# Patient Record
Sex: FEMALE | Race: WHITE | NOT HISPANIC OR LATINO | Employment: OTHER | URBAN - NONMETROPOLITAN AREA
[De-identification: names, ages, dates, MRNs, and addresses within clinical notes are randomized per-mention and may not be internally consistent; named-entity substitution may affect disease eponyms.]

---

## 2020-08-24 ENCOUNTER — APPOINTMENT (EMERGENCY)
Dept: RADIOLOGY | Facility: HOSPITAL | Age: 85
End: 2020-08-24
Payer: MEDICARE

## 2020-08-24 ENCOUNTER — APPOINTMENT (INPATIENT)
Dept: CT IMAGING | Facility: HOSPITAL | Age: 85
End: 2020-08-24
Payer: MEDICARE

## 2020-08-24 ENCOUNTER — HOSPITAL ENCOUNTER (EMERGENCY)
Facility: HOSPITAL | Age: 85
Discharge: HOME | End: 2020-08-24
Attending: EMERGENCY MEDICINE | Admitting: EMERGENCY MEDICINE
Payer: MEDICARE

## 2020-08-24 ENCOUNTER — HOSPITAL ENCOUNTER (OUTPATIENT)
Facility: HOSPITAL | Age: 85
Setting detail: OBSERVATION
Discharge: HOME | End: 2020-08-25
Attending: EMERGENCY MEDICINE | Admitting: FAMILY MEDICINE
Payer: MEDICARE

## 2020-08-24 VITALS
RESPIRATION RATE: 18 BRPM | SYSTOLIC BLOOD PRESSURE: 139 MMHG | OXYGEN SATURATION: 97 % | TEMPERATURE: 97.4 F | HEART RATE: 71 BPM | DIASTOLIC BLOOD PRESSURE: 64 MMHG | WEIGHT: 130.95 LBS

## 2020-08-24 DIAGNOSIS — S32.89XA CLOSED FRACTURE OF OTHER PARTS OF PELVIS, INITIAL ENCOUNTER (HCC): ICD-10-CM

## 2020-08-24 DIAGNOSIS — R55 SYNCOPE: Primary | ICD-10-CM

## 2020-08-24 DIAGNOSIS — T63.441A BEE STING: Primary | ICD-10-CM

## 2020-08-24 DIAGNOSIS — S60.449A CONSTRICTIVE JEWELRY OF FINGER, INITIAL ENCOUNTER: ICD-10-CM

## 2020-08-24 DIAGNOSIS — M25.551 ACUTE HIP PAIN, RIGHT: ICD-10-CM
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
## 2020-08-24 LAB
ALBUMIN SERPL BCP-MCNC: 3.6 G/DL (ref 3.5–5)
ALP SERPL-CCNC: 59 U/L (ref 46–116)
ALT SERPL W P-5'-P-CCNC: 30 U/L (ref 12–78)
ANION GAP SERPL CALCULATED.3IONS-SCNC: 7 MMOL/L (ref 4–13)
AST SERPL W P-5'-P-CCNC: 24 U/L (ref 5–45)
BASOPHILS # BLD AUTO: 0.02 THOUSANDS/ΜL (ref 0–0.1)
BASOPHILS NFR BLD AUTO: 0 % (ref 0–1)
BILIRUB SERPL-MCNC: 0.51 MG/DL (ref 0.2–1)
BUN SERPL-MCNC: 21 MG/DL (ref 5–25)
CALCIUM SERPL-MCNC: 9 MG/DL (ref 8.3–10.1)
CHLORIDE SERPL-SCNC: 106 MMOL/L (ref 100–108)
CO2 SERPL-SCNC: 29 MMOL/L (ref 21–32)
CREAT SERPL-MCNC: 0.83 MG/DL (ref 0.6–1.3)
EOSINOPHIL # BLD AUTO: 0.17 THOUSAND/ΜL (ref 0–0.61)
EOSINOPHIL NFR BLD AUTO: 2 % (ref 0–6)
ERYTHROCYTE [DISTWIDTH] IN BLOOD BY AUTOMATED COUNT: 12.8 % (ref 11.6–15.1)
GFR SERPL CREATININE-BSD FRML MDRD: 65 ML/MIN/1.73SQ M
GLUCOSE SERPL-MCNC: 122 MG/DL (ref 65–140)
HCT VFR BLD AUTO: 45.7 % (ref 34.8–46.1)
HGB BLD-MCNC: 15 G/DL (ref 11.5–15.4)
IMM GRANULOCYTES # BLD AUTO: 0.04 THOUSAND/UL (ref 0–0.2)
IMM GRANULOCYTES NFR BLD AUTO: 0 % (ref 0–2)
LYMPHOCYTES # BLD AUTO: 2.38 THOUSANDS/ΜL (ref 0.6–4.47)
LYMPHOCYTES NFR BLD AUTO: 25 % (ref 14–44)
MCH RBC QN AUTO: 30.7 PG (ref 26.8–34.3)
MCHC RBC AUTO-ENTMCNC: 32.8 G/DL (ref 31.4–37.4)
MCV RBC AUTO: 94 FL (ref 82–98)
MONOCYTES # BLD AUTO: 0.52 THOUSAND/ΜL (ref 0.17–1.22)
MONOCYTES NFR BLD AUTO: 6 % (ref 4–12)
NEUTROPHILS # BLD AUTO: 6.3 THOUSANDS/ΜL (ref 1.85–7.62)
NEUTS SEG NFR BLD AUTO: 67 % (ref 43–75)
NRBC BLD AUTO-RTO: 0 /100 WBCS
PLATELET # BLD AUTO: 236 THOUSANDS/UL (ref 149–390)
PMV BLD AUTO: 10.4 FL (ref 8.9–12.7)
POTASSIUM SERPL-SCNC: 3.9 MMOL/L (ref 3.5–5.3)
PROT SERPL-MCNC: 6.6 G/DL (ref 6.4–8.2)
RBC # BLD AUTO: 4.88 MILLION/UL (ref 3.81–5.12)
SODIUM SERPL-SCNC: 142 MMOL/L (ref 136–145)
TROPONIN I SERPL-MCNC: <0.02 NG/ML
WBC # BLD AUTO: 9.43 THOUSAND/UL (ref 4.31–10.16)

## 2020-08-24 PROCEDURE — G1004 CDSM NDSC: HCPCS

## 2020-08-24 PROCEDURE — 99285 EMERGENCY DEPT VISIT HI MDM: CPT | Performed by: EMERGENCY MEDICINE

## 2020-08-24 PROCEDURE — 73502 X-RAY EXAM HIP UNI 2-3 VIEWS: CPT

## 2020-08-24 PROCEDURE — 99222 1ST HOSP IP/OBS MODERATE 55: CPT | Performed by: FAMILY MEDICINE

## 2020-08-24 PROCEDURE — 96360 HYDRATION IV INFUSION INIT: CPT

## 2020-08-24 PROCEDURE — 96375 TX/PRO/DX INJ NEW DRUG ADDON: CPT

## 2020-08-24 PROCEDURE — 84484 ASSAY OF TROPONIN QUANT: CPT | Performed by: EMERGENCY MEDICINE

## 2020-08-24 PROCEDURE — 93005 ELECTROCARDIOGRAM TRACING: CPT

## 2020-08-24 PROCEDURE — 85025 COMPLETE CBC W/AUTO DIFF WBC: CPT | Performed by: EMERGENCY MEDICINE

## 2020-08-24 PROCEDURE — 96374 THER/PROPH/DIAG INJ IV PUSH: CPT

## 2020-08-24 PROCEDURE — 72192 CT PELVIS W/O DYE: CPT

## 2020-08-24 PROCEDURE — 99285 EMERGENCY DEPT VISIT HI MDM: CPT

## 2020-08-24 PROCEDURE — 80053 COMPREHEN METABOLIC PANEL: CPT | Performed by: EMERGENCY MEDICINE

## 2020-08-24 PROCEDURE — 99283 EMERGENCY DEPT VISIT LOW MDM: CPT

## 2020-08-24 PROCEDURE — 36415 COLL VENOUS BLD VENIPUNCTURE: CPT | Performed by: EMERGENCY MEDICINE

## 2020-08-24 RX ORDER — SIMVASTATIN 40 MG
40 TABLET ORAL
COMMUNITY

## 2020-08-24 RX ORDER — ONDANSETRON 2 MG/ML
4 INJECTION INTRAMUSCULAR; INTRAVENOUS EVERY 6 HOURS PRN
Status: DISCONTINUED | OUTPATIENT
Start: 2020-08-24 | End: 2020-08-25 | Stop reason: HOSPADM

## 2020-08-24 RX ORDER — LEVOTHYROXINE SODIUM 0.07 MG/1
75 TABLET ORAL DAILY
COMMUNITY

## 2020-08-24 RX ORDER — NAPROXEN 250 MG/1
250 TABLET ORAL 2 TIMES DAILY WITH MEALS
COMMUNITY
End: 2020-08-25 | Stop reason: HOSPADM

## 2020-08-24 RX ORDER — PREDNISONE 20 MG/1
40 TABLET ORAL DAILY
Qty: 10 TABLET | Refills: 0 | Status: SHIPPED | OUTPATIENT
Start: 2020-08-25 | End: 2020-08-25 | Stop reason: HOSPADM

## 2020-08-24 RX ORDER — FOLIC ACID/MULTIVIT,IRON,MINER .4-18-35
1 TABLET,CHEWABLE ORAL DAILY
COMMUNITY

## 2020-08-24 RX ORDER — LATANOPROST 50 UG/ML
1 SOLUTION/ DROPS OPHTHALMIC
COMMUNITY

## 2020-08-24 RX ORDER — METHYLPREDNISOLONE SODIUM SUCCINATE 125 MG/2ML
125 INJECTION, POWDER, LYOPHILIZED, FOR SOLUTION INTRAMUSCULAR; INTRAVENOUS ONCE
Status: COMPLETED | OUTPATIENT
Start: 2020-08-24 | End: 2020-08-24

## 2020-08-24 RX ORDER — ASPIRIN 81 MG/1
81 TABLET, CHEWABLE ORAL DAILY
COMMUNITY
End: 2020-08-25 | Stop reason: HOSPADM

## 2020-08-24 RX ORDER — SODIUM CHLORIDE 9 MG/ML
100 INJECTION, SOLUTION INTRAVENOUS CONTINUOUS
Status: DISCONTINUED | OUTPATIENT
Start: 2020-08-24 | End: 2020-08-25

## 2020-08-24 RX ORDER — SODIUM CHLORIDE 9 MG/ML
175 INJECTION, SOLUTION INTRAVENOUS CONTINUOUS
Status: DISCONTINUED | OUTPATIENT
Start: 2020-08-24 | End: 2020-08-24

## 2020-08-24 RX ORDER — LEVOTHYROXINE SODIUM 0.1 MG/1
100 TABLET ORAL DAILY
COMMUNITY
End: 2020-08-25 | Stop reason: HOSPADM

## 2020-08-24 RX ORDER — CALCIUM CARBONATE 200(500)MG
1000 TABLET,CHEWABLE ORAL DAILY PRN
Status: DISCONTINUED | OUTPATIENT
Start: 2020-08-24 | End: 2020-08-25 | Stop reason: HOSPADM

## 2020-08-24 RX ORDER — DIPHENHYDRAMINE HYDROCHLORIDE 50 MG/ML
25 INJECTION INTRAMUSCULAR; INTRAVENOUS ONCE
Status: COMPLETED | OUTPATIENT
Start: 2020-08-24 | End: 2020-08-24

## 2020-08-24 RX ORDER — LANOLIN ALCOHOL/MO/W.PET/CERES
2 CREAM (GRAM) TOPICAL DAILY
COMMUNITY

## 2020-08-24 RX ORDER — ACETAMINOPHEN 325 MG/1
650 TABLET ORAL EVERY 6 HOURS PRN
Status: DISCONTINUED | OUTPATIENT
Start: 2020-08-24 | End: 2020-08-25 | Stop reason: HOSPADM

## 2020-08-24 RX ADMIN — METHYLPREDNISOLONE SODIUM SUCCINATE 125 MG: 125 INJECTION, POWDER, FOR SOLUTION INTRAMUSCULAR; INTRAVENOUS at 10:28

## 2020-08-24 RX ADMIN — SODIUM CHLORIDE 100 ML/HR: 0.9 INJECTION, SOLUTION INTRAVENOUS at 17:56

## 2020-08-24 RX ADMIN — DIPHENHYDRAMINE HYDROCHLORIDE 25 MG: 50 INJECTION, SOLUTION INTRAMUSCULAR; INTRAVENOUS at 10:28

## 2020-08-24 RX ADMIN — SODIUM CHLORIDE 175 ML/HR: 0.9 INJECTION, SOLUTION INTRAVENOUS at 14:18

## 2020-08-24 RX ADMIN — FAMOTIDINE 20 MG: 10 INJECTION, SOLUTION INTRAVENOUS at 10:28

## 2020-08-24 NOTE — PLAN OF CARE
Problem: Potential for Falls  Goal: Patient will remain free of falls  Description: INTERVENTIONS:  - Assess patient frequently for physical needs  -  Identify cognitive and physical deficits and behaviors that affect risk of falls    -  [place] fall precautions as indicated by assessment   - Educate patient/family on patient safety including physical limitations  - Instruct patient to call for assistance with activity based on assessment  - Modify environment to reduce risk of injury  - Consider OT/PT consult to assist with strengthening/mobility  Outcome: Progressing     Problem: CARDIOVASCULAR - ADULT  Goal: Maintains optimal cardiac output and hemodynamic stability  Description: INTERVENTIONS:  - Monitor I/O, vital signs and rhythm  - Monitor for S/S and trends of decreased cardiac output  - Administer and titrate ordered vasoactive medications to optimize hemodynamic stability  - Assess quality of pulses, skin color and temperature  - Assess for signs of decreased coronary artery perfusion  - Instruct patient to report change in severity of symptoms  Outcome: Progressing  Goal: Absence of cardiac dysrhythmias or at baseline rhythm  Description: INTERVENTIONS:  - Continuous cardiac monitoring, vital signs, obtain 12 lead EKG if ordered  - Administer antiarrhythmic and heart rate control medications as ordered  - Monitor electrolytes and administer replacement therapy as ordered  Outcome: Progressing     Problem: SKIN/TISSUE INTEGRITY - ADULT  Goal: Skin integrity remains intact  Description: INTERVENTIONS  - Identify patients at risk for skin breakdown  - Assess and monitor skin integrity  - Assess and monitor nutrition and hydration status  - Monitor labs (i e  albumin)  - Assess for incontinence   - Turn and reposition patient  - Assist with mobility/ambulation  - Relieve pressure over bony prominences  - Avoid friction and shearing  - Provide appropriate hygiene as needed including keeping skin clean and dry  - Evaluate need for skin moisturizer/barrier cream  - Collaborate with interdisciplinary team (i e  Nutrition, Rehabilitation, etc )   - Patient/family teaching  Outcome: Progressing     Problem: MUSCULOSKELETAL - ADULT  Goal: Maintain or return mobility to safest level of function  Description: INTERVENTIONS:  - Assess patient's ability to carry out ADLs; assess patient's baseline for ADL function and identify physical deficits which impact ability to perform ADLs (bathing, care of mouth/teeth, toileting, grooming, dressing, etc )  - Assess/evaluate cause of self-care deficits   - Assess range of motion  - Assess patient's mobility  - Assess patient's need for assistive devices and provide as appropriate  - Encourage maximum independence but intervene and supervise when necessary  - Involve family in performance of ADLs  - Assess for home care needs following discharge   - Consider OT consult to assist with ADL evaluation and planning for discharge  - Provide patient education as appropriate  Outcome: Progressing  Goal: Maintain proper alignment of affected body part  Description: INTERVENTIONS:  - Support, maintain and protect limb and body alignment  - Provide patient/ family with appropriate education  Outcome: Progressing     Problem: PAIN - ADULT  Goal: Verbalizes/displays adequate comfort level or baseline comfort level  Description: Interventions:  - Encourage patient to monitor pain and request assistance  - Assess pain using appropriate pain scale  - Administer analgesics based on type and severity of pain and evaluate response  - Implement non-pharmacological measures as appropriate and evaluate response  - Consider cultural and social influences on pain and pain management  - Notify physician/advanced practitioner if interventions unsuccessful or patient reports new pain  Outcome: Progressing     Problem: INFECTION - ADULT  Goal: Absence or prevention of progression during hospitalization  Description: INTERVENTIONS:  - Assess and monitor for signs and symptoms of infection  - Monitor lab/diagnostic results  - Monitor all insertion sites, i e  indwelling lines, tubes, and drains  - Monitor endotracheal if appropriate and nasal secretions for changes in amount and color  - [place] appropriate cooling/warming therapies per order  - Administer medications as ordered  - Instruct and encourage patient and family to use good hand hygiene technique  - Identify and instruct in appropriate isolation precautions for identified infection/condition  Outcome: Progressing  Goal: Absence of fever/infection during neutropenic period  Description: INTERVENTIONS:  - Monitor WBC    Outcome: Progressing     Problem: SAFETY ADULT  Goal: Patient will remain free of falls  Description: INTERVENTIONS:  - Assess patient frequently for physical needs  -  Identify cognitive and physical deficits and behaviors that affect risk of falls    -  [place] fall precautions as indicated by assessment   - Educate patient/family on patient safety including physical limitations  - Instruct patient to call for assistance with activity based on assessment  - Modify environment to reduce risk of injury  - Consider OT/PT consult to assist with strengthening/mobility  Outcome: Progressing  Goal: Maintain or return to baseline ADL function  Description: INTERVENTIONS:  -  Assess patient's ability to carry out ADLs; assess patient's baseline for ADL function and identify physical deficits which impact ability to perform ADLs (bathing, care of mouth/teeth, toileting, grooming, dressing, etc )  - Assess/evaluate cause of self-care deficits   - Assess range of motion  - Assess patient's mobility; develop plan if impaired  - Assess patient's need for assistive devices and provide as appropriate  - Encourage maximum independence but intervene and supervise when necessary  - Involve family in performance of ADLs  - Assess for home care needs following discharge   - Consider OT consult to assist with ADL evaluation and planning for discharge  - Provide patient education as appropriate  Outcome: Progressing  Goal: Maintain or return mobility status to optimal level  Description: INTERVENTIONS:  - Assess patient's baseline mobility status (ambulation, transfers, stairs, etc )    - Identify cognitive and physical deficits and behaviors that affect mobility  - Identify mobility aids required to assist with transfers and/or ambulation (gait belt, sit-to-stand, lift, walker, cane, etc )  - [place] fall precautions as indicated by assessment  - Record patient progress and toleration of activity level on Mobility SBAR; progress patient to next Phase/Stage  - Instruct patient to call for assistance with activity based on assessment  - Consider rehabilitation consult to assist with strengthening/weightbearing, etc   Outcome: Progressing     Problem: DISCHARGE PLANNING  Goal: Discharge to home or other facility with appropriate resources  Description: INTERVENTIONS:  - Identify barriers to discharge w/patient and caregiver  - Arrange for needed discharge resources and transportation as appropriate  - Identify discharge learning needs (meds, wound care, etc )  - Arrange for interpretive services to assist at discharge as needed  - Refer to Case Management Department for coordinating discharge planning if the patient needs post-hospital services based on physician/advanced practitioner order or complex needs related to functional status, cognitive ability, or social support system  Outcome: Progressing     Problem: Knowledge Deficit  Goal: Patient/family/caregiver demonstrates understanding of disease process, treatment plan, medications, and discharge instructions  Description: Complete learning assessment and assess knowledge base    Interventions:  - Provide teaching at level of understanding  - Provide teaching via preferred learning methods  Outcome: Progressing

## 2020-08-24 NOTE — H&P
H&P- Deangelo Brunner 1934, 80 y o  female MRN: [id number]    Unit/Bed#: -[facility] Encounter: [id number]    Primary Care Provider: Lan Curry MD   Date and time admitted to hospital: 8/24/2020  1:43 PM        * Syncope  Assessment & Plan  Patient states that she was recently in the ED due to having a bee sting and a rash that developed due to that  Due to the urticarial rash she developed post bee sting she received Benadryl, famotidine and steroid shot  After she went home she had a syncopal episode and fell and hit her right hip  Syncope was probably due to the effects of medications recently received  No prior history of any chest pain or palpitations or arrhythmias  Will do an EKG and check TSH and monitor for now  Acute hip pain, right  Assessment & Plan  Patient has pain in her right hip status post fall  X-ray right hip shows Equivocal right superior pubic ramus fracture  Patient is complaining of pain more in the inferior lateral region of her buttock  Will do a CT pelvis for further evaluation to rule out fracture  Will keep her nonweightbearing of the right lower extremity and consult placed to Orthopedics  Also consult placed to physical therapy and occupational therapy  Tylenol and Toradol for pain control  Bee sting  Assessment & Plan  Urticarial  rash due to bee sting has resolved  Monitor for now    Vasovagal syncope:  Monitor orthostatic vitals once cleared by Orthopedics unable to bear weight on the right leg    Addendum:  CT pelvis does not show any fractures but it does show a right gluteus intramuscular hematoma which is 4 9 cm and 2 1 cm hematoma on the left gluteus  Counseled orthopedics consult    Place warm compresses and PT OT consult  VTE Prophylaxis: Enoxaparin (Lovenox)  / sequential compression device   Code Status:  DNR/DNI  POLST: There is no POLST form on file for this patient (pre-hospital)  Discussion with family:  Discussion with  at bedside    Anticipated Length of Stay:  Patient will be admitted on an observation basis with an anticipated length of stay of less than 2 midnights  Justification for Hospital Stay:  Syncopal episode and right hip pain    Total Time for Visit, including Counseling / Coordination of Care: 1 hour  Greater than 50% of this total time spent on direct patient counseling and coordination of care  Chief Complaint:   Passed out and fell    History of Present Illness:    Davidson Parada is a 80 y o  female who presents with passed out and fell at home  Patient states that she was here in the emergency room earlier today after she had a bee sting which resulted in a generalized rash on body including her left arm  She also felt dizzy and lightheaded due to the bee sting  She received famotidine, Benadryl and steroid shot in the ED and went home  She ambulated in the ED with no complaints prior to discharge  At home she got dizzy and lightheaded and passed out and fell and hit her right hip  She is having severe pain in her right hip which was 8 x 10 intensity and unable to bear weight on her right leg  She was initially found to be hypotensive with blood pressure of 70 systolic and she was given IV fluid hydration by EMS  Patient currently denies any dizziness or lightheadedness while laying in bed  Denies any prior history of chest pain or shortness of breath or syncope  Rash is completely resolved  only complaint at this time is her right hip pain    Review of Systems:    Review of Systems   Constitutional: Positive for fatigue  Negative for appetite change, chills and fever  HENT: Negative for hearing loss, sore throat and trouble swallowing  Eyes: Negative for photophobia, discharge and visual disturbance  Respiratory: Negative for chest tightness and shortness of breath  Cardiovascular: Negative for chest pain and palpitations     Gastrointestinal: Negative for abdominal pain, blood in stool and vomiting  Endocrine: Negative for polydipsia and polyuria  Genitourinary: Negative for difficulty urinating, dysuria, flank pain and hematuria  Musculoskeletal: Positive for arthralgias, gait problem and myalgias  Negative for back pain  Skin: Negative for rash  Allergic/Immunologic: Negative for environmental allergies and food allergies  Neurological: Positive for syncope and weakness  Negative for dizziness, seizures and headaches  Hematological: Does not bruise/bleed easily  Psychiatric/Behavioral: Negative for behavioral problems  All other systems reviewed and are negative  Past Medical and Surgical History:     Past Medical History:   Diagnosis Date    Disease of thyroid gland     Hypercholesterolemia        Past Surgical History:   Procedure Laterality Date    EYE SURGERY Bilateral     TONSILLECTOMY         Meds/Allergies:    Prior to Admission medications    Medication Sig Start Date End Date Taking? Authorizing Provider   predniSONE 20 mg tablet Take 2 tablets (40 mg total) by mouth daily 8/25/20   Mikayla Mann, DO     I have reviewed home medications with patient personally  Allergies: No Known Allergies    Social History:     Marital Status: /Civil Union   Social History     Substance and Sexual Activity   Alcohol Use Not Currently     Social History     Tobacco Use   Smoking Status Never Smoker   Smokeless Tobacco Never Used     Social History     Substance and Sexual Activity   Drug Use Never       Family History:    Family History   Problem Relation Age of Onset    Hypertension Father        Physical Exam:     Vitals:   Blood Pressure: 136/76 (08/24/20 1625)  Pulse: 85 (08/24/20 1530)  Temperature: 97 6 °F (36 4 °C) (08/24/20 1625)  Temp Source: Temporal (08/24/20 1347)  Respirations: 18 (08/24/20 1625)  Weight - Scale: 56 8 kg (125 lb 3 5 oz) (08/24/20 1347)  SpO2: 98 % (08/24/20 1530)    Physical Exam  Vitals signs and nursing note reviewed     Constitutional: Appearance: Normal appearance  HENT:      Head: Normocephalic and atraumatic  Right Ear: External ear normal       Left Ear: External ear normal       Nose: Nose normal       Mouth/Throat:      Pharynx: Oropharynx is clear  Neck:      Musculoskeletal: Normal range of motion and neck supple  Cardiovascular:      Rate and Rhythm: Normal rate and regular rhythm  Heart sounds: Normal heart sounds  Pulmonary:      Effort: Pulmonary effort is normal       Breath sounds: Normal breath sounds  Abdominal:      General: Bowel sounds are normal       Palpations: Abdomen is soft  Tenderness: There is no abdominal tenderness  Musculoskeletal:      Comments: Tenderness on palpation of the right hip with pain exacerbated by straight leg raise of 30°   Skin:     General: Skin is warm and dry  Capillary Refill: Capillary refill takes less than 2 seconds  Neurological:      General: No focal deficit present  Mental Status: She is alert and oriented to person, place, and time  Psychiatric:      Comments: Anxious            Additional Data:     Lab Results: I have personally reviewed pertinent reports  Results from last 7 days   Lab Units 08/24/20  1027   WBC Thousand/uL 9 43   HEMOGLOBIN g/dL 15 0   HEMATOCRIT % 45 7   PLATELETS Thousands/uL 236   NEUTROS PCT % 67   LYMPHS PCT % 25   MONOS PCT % 6   EOS PCT % 2     Results from last 7 days   Lab Units 08/24/20  1027   SODIUM mmol/L 142   POTASSIUM mmol/L 3 9   CHLORIDE mmol/L 106   CO2 mmol/L 29   BUN mg/dL 21   CREATININE mg/dL 0 83   ANION GAP mmol/L 7   CALCIUM mg/dL 9 0   ALBUMIN g/dL 3 6   TOTAL BILIRUBIN mg/dL 0 51   ALK PHOS U/L 59   ALT U/L 30   AST U/L 24   GLUCOSE RANDOM mg/dL 122                       Imaging: I have personally reviewed pertinent reports  XR hip/pelv 2-3 vws right if performed   Final Result by Jacqueline Guillory MD (08/24 5897)      The right hip appears unremarkable        Equivocal right superior pubic ramus fracture  The study was marked in EPIC for significant notification  Workstation performed: [id number]         CT pelvis wo contrast    (Results Pending)       EKG, Pathology, and Other Studies Reviewed on Admission:   · EKG:  Ordered    Allscripts / Epic Records Reviewed: Yes     ** Please Note: This note has been constructed using a voice recognition system   **

## 2020-08-24 NOTE — ED PROVIDER NOTES
History  Chief Complaint   Patient presents with    Bee Sting     Pt c/o pain in left hand and total body itchiness - also states she became dizzy initially after the bite and fell to floor - denies LOC, also denies difficulty swallowing or breathing     80year-old female presents emergency room with chief complaint having been stung by an insect about 1 hour prior to arrival on her left hand  Patient was upstairs in her laundry room when she was stung and then shortly thereafter she began to feel shaky, and slightly lightheaded  Patient reports that she now has hives and feels itchy across most of her body  She apparently became dizzy after this and, while she is unsure, she reports that she may have passed out for a moment  Her  came to her aid and noticing the diffuse rash patient was brought to the emergency room for further evaluation  The patient denies any headache, shortness of breath, chest pain, nausea vomiting or diarrhea  Did not try any medications prior to arrival   She is currently tremulous  Her left hand is obviously swollen and there appears to be a ring entrapment on her left 4th digit      History provided by:  Patient and spouse  Insect Bite   Contact animal:  Insect  Location:  Hand  Hand injury location:  L hand  Time since incident:  1 hour  Pain details:     Quality:  Sore    Severity:  Mild    Timing:  Constant    Progression:  Worsening  Incident location:  Home  Relieved by:  None tried  Ineffective treatments:  None tried  Associated symptoms: rash and swelling    Associated symptoms: no fever        None       Past Medical History:   Diagnosis Date    Disease of thyroid gland     Hypercholesterolemia        Past Surgical History:   Procedure Laterality Date    EYE SURGERY Bilateral     TONSILLECTOMY         No family history on file  I have reviewed and agree with the history as documented      E-Cigarette/Vaping    E-Cigarette Use Never User E-Cigarette/Vaping Substances     Social History     Tobacco Use    Smoking status: Never Smoker    Smokeless tobacco: Never Used   Substance Use Topics    Alcohol use: Not Currently    Drug use: Not on file       Review of Systems   Constitutional: Negative for activity change, diaphoresis and fever  HENT: Negative for congestion, ear pain, rhinorrhea, sinus pressure and sore throat  Eyes: Negative  Respiratory: Negative for cough, chest tightness, shortness of breath and wheezing  Cardiovascular: Negative for chest pain, palpitations and leg swelling  Gastrointestinal: Negative for abdominal pain, diarrhea, nausea and vomiting  Endocrine: Negative  Genitourinary: Negative for decreased urine volume, dysuria and flank pain  Musculoskeletal: Negative for arthralgias, back pain and myalgias  Skin: Positive for rash  Negative for pallor  Allergic/Immunologic: Negative  Neurological: Negative for dizziness, weakness and headaches  Hematological: Negative  Psychiatric/Behavioral: Negative  All other systems reviewed and are negative  Physical Exam  Physical Exam  Vitals signs and nursing note reviewed  Constitutional:       General: She is in acute distress  Appearance: She is well-developed  HENT:      Head: Normocephalic and atraumatic  Nose: Nose normal       Mouth/Throat:      Mouth: Mucous membranes are moist       Pharynx: Oropharynx is clear  Eyes:      Pupils: Pupils are equal, round, and reactive to light  Neck:      Musculoskeletal: Normal range of motion and neck supple  Cardiovascular:      Rate and Rhythm: Normal rate and regular rhythm  Heart sounds: Normal heart sounds  No murmur  Pulmonary:      Effort: Pulmonary effort is normal  No respiratory distress  Breath sounds: Normal breath sounds  No stridor  No wheezing or rales  Chest:      Chest wall: No tenderness  Abdominal:      General: There is no distension  Palpations: Abdomen is soft  Tenderness: There is no abdominal tenderness  There is no guarding or rebound  Musculoskeletal: Normal range of motion  General: Tenderness (Lower left buttocks) present  No deformity  Left hand: She exhibits swelling  She exhibits normal range of motion and no tenderness  Hands:    Skin:     General: Skin is warm and dry  Coloration: Skin is not pale  Findings: Rash (Diffuse urticarial rash on both upper extremities, lower extremities and torso) present  Neurological:      Mental Status: She is alert and oriented to person, place, and time  Cranial Nerves: No cranial nerve deficit        Comments: Tremulous   Psychiatric:         Mood and Affect: Mood normal          Vital Signs  ED Triage Vitals [08/24/20 0957]   Temperature Pulse Respirations Blood Pressure SpO2   (!) 97 1 °F (36 2 °C) 88 20 116/83 96 %      Temp Source Heart Rate Source Patient Position - Orthostatic VS BP Location FiO2 (%)   Temporal Monitor Lying Right arm --      Pain Score       6           Vitals:    08/24/20 1030 08/24/20 1100 08/24/20 1130 08/24/20 1157   BP: 141/67 135/60 139/64 139/64   Pulse: 82 81 69 71   Patient Position - Orthostatic VS: Lying Lying Lying Lying         Visual Acuity  Visual Acuity      Most Recent Value   L Pupil Size (mm)  3   R Pupil Size (mm)  3          ED Medications  Medications   diphenhydrAMINE (BENADRYL) injection 25 mg (25 mg Intravenous Given 8/24/20 1028)   methylPREDNISolone sodium succinate (Solu-MEDROL) injection 125 mg (125 mg Intravenous Given 8/24/20 1028)   famotidine (PEPCID) injection 20 mg (20 mg Intravenous Given 8/24/20 1028)       Diagnostic Studies  Results Reviewed     Procedure Component Value Units Date/Time    Troponin I [[id number]]  (Normal) Collected:  08/24/20 1027    Lab Status:  Final result Specimen:  Blood from Arm, Right Updated:  08/24/20 1058     Troponin I <0 02 ng/mL     Comprehensive metabolic panel [[id number]] Collected:  08/24/20 1027    Lab Status:  Final result Specimen:  Blood from Arm, Right Updated:  08/24/20 1055     Sodium 142 mmol/L      Potassium 3 9 mmol/L      Chloride 106 mmol/L      CO2 29 mmol/L      ANION GAP 7 mmol/L      BUN 21 mg/dL      Creatinine 0 83 mg/dL      Glucose 122 mg/dL      Calcium 9 0 mg/dL      AST 24 U/L      ALT 30 U/L      Alkaline Phosphatase 59 U/L      Total Protein 6 6 g/dL      Albumin 3 6 g/dL      Total Bilirubin 0 51 mg/dL      eGFR 65 ml/min/1 73sq m     Narrative:       National Kid[employer] Disease Foundation guidelines for Chronic Kidney Disease (CKD):     Stage 1 with normal or high GFR (GFR > 90 mL/min/1 73 square meters)    Stage 2 Mild CKD (GFR = 60-89 mL/min/1 73 square meters)    Stage 3A Moderate CKD (GFR = 45-59 mL/min/1 73 square meters)    Stage 3B Moderate CKD (GFR = 30-44 mL/min/1 73 square meters)    Stage 4 Severe CKD (GFR = 15-29 mL/min/1 73 square meters)    Stage 5 End Stage CKD (GFR <15 mL/min/1 73 square meters)  Note: GFR calculation is accurate only with a steady state creatinine    CBC and differential [[id number]] Collected:  08/24/20 1027    Lab Status:  Final result Specimen:  Blood from Arm, Right Updated:  08/24/20 1040     WBC 9 43 Thousand/uL      RBC 4 88 Million/uL      Hemoglobin 15 0 g/dL      Hematocrit 45 7 %      MCV 94 fL      MCH 30 7 pg      MCHC 32 8 g/dL      RDW 12 8 %      MPV 10 4 fL      Platelets 206 Thousands/uL      nRBC 0 /100 WBCs      Neutrophils Relative 67 %      Immat GRANS % 0 %      Lymphocytes Relative 25 %      Monocytes Relative 6 %      Eosinophils Relative 2 %      Basophils Relative 0 %      Neutrophils Absolute 6 30 Thousands/µL      Immature Grans Absolute 0 04 Thousand/uL      Lymphocytes Absolute 2 38 Thousands/µL      Monocytes Absolute 0 52 Thousand/µL      Eosinophils Absolute 0 17 Thousand/µL      Basophils Absolute 0 02 Thousands/µL                  No orders to display Procedures  Orthopedic injury treatment    Date/Time: 8/24/2020 10:30 AM  Performed by: Radha Dial DO  Authorized by: Radha Dial DO     Patient Location:  ED  Other Assisting Provider: No    Verbal consent obtained?: Yes    Risks and benefits: Risks, benefits and alternatives were discussed    Consent given by:  Patient  Injury location:  Finger  Location details:  Left ring finger  Injury type: Swelling and ring entrapment  Neurovascular status: Neurovascularly intact    Distal perfusion: normal    Neurological function: normal    Range of motion: normal    Local anesthesia used?: No    Neurovascular status: Neurovascularly intact    Distal perfusion: normal    Neurological function: normal    Range of motion: normal    Patient tolerance:  Patient tolerated the procedure well with no immediate complications   Two rings removed from left index finger with string decompression of swelling    ECG 12 Lead Documentation Only    Date/Time: 8/24/2020 10:40 AM  Performed by: Radha Dial DO  Authorized by: Radha Dial DO     Indications / Diagnosis:  Near syncope  ECG reviewed by me, the ED Provider: yes    Patient location:  ED  Previous ECG:     Previous ECG:  Unavailable    Comparison to cardiac monitor: Yes    Interpretation:     Interpretation: normal    Rate:     ECG rate assessment: normal    Rhythm:     Rhythm: sinus rhythm    Ectopy:     Ectopy: none    QRS:     QRS axis:  Normal  ST segments:     ST segments:  Non-specific  T waves:     T waves: non-specific               ED Course  ED Course as of Aug 24 1220   Mon Aug 24, 2020   1045 Hives almost completely relieved      1046 EKG negative      1141 Patient has been observed and reports that she is feeling better and wishes to be discharged home    I discussed with both her and her spouse symptoms to look for should she get all worse and advised that we would keep her on five days of steroids and that she should also  some diphenhydramine to be used as needed  Patient has an appointment on Wednesday with her primary care physician and any additional interventions required may be discussed at that time  Patient was discharged in stable condition  1142 Troponin I: <0 02   1219 Patient had an episode of feeling dizzy after we attempted to discharge her  I personally re-evaluated her and found her to be doing better after we had set her down for a little bit and then got her up ambulating again  Patient reports that she then felt fine and was no longer tremulous  I feel that the patient likely had some late effects secondary to the Benadryl I gave her and now that she is doing well she is stable for discharge and they understand to return with any concerns or worsening  US AUDIT      Most Recent Value   Initial Alcohol Screen: US AUDIT-C    1  How often do you have a drink containing alcohol?  0 Filed at: 08/24/2020 1000   2  How many drinks containing alcohol do you have on a typical day you are drinking? 0 Filed at: 08/24/2020 1000   3b  FEMALE Any Age, or MALE 65+: How often do you have 4 or more drinks on one occassion? 0 Filed at: 08/24/2020 1000   Audit-C Score  0 Filed at: 08/24/2020 1000                  [place]/DAST-10      Most Recent Value   How many times in the past year have you    Used an illegal drug or used a prescription medication for non-medical reasons?   Never Filed at: 08/24/2020 1001                                MDM      Disposition  Final diagnoses:   Bee sting   Constrictive jewelry of finger, initial encounter     Time reflects when diagnosis was documented in both MDM as applicable and the Disposition within this note     Time User Action Codes Description Comment    8/24/2020 10:47 AM Natalie Hunter Add [Y49 [id number]] Bee sting     8/24/2020 10:48 AM Natalie Hunter Add [[id number],  [id number]] Constrictive jewelry of finger, initial encounter       ED Disposition     ED Disposition Condition Date/Time Comment    Discharge Stable Mon Aug 24, 2020 11:42 AM Madison Rizo discharge to home/self care  Follow-up Information     Follow up With Specialties Details Why Pramod Austin MD Family Medicine On 8/26/2020 As scheduled [address]  [phone]            Discharge Medication List as of 8/24/2020 11:43 AM      START taking these medications    Details   predniSONE 20 mg tablet Take 2 tablets (40 mg total) by mouth daily, Starting Tue 8/25/2020, Normal           No discharge procedures on file      PDMP Review     None          ED Provider  Electronically Signed by           Radha Dial,   08/24/20 155 [facility] DO [place]  08/24/20 [id number]

## 2020-08-24 NOTE — ASSESSMENT & PLAN NOTE
Patient states that she was recently in the ED due to having a bee sting and a rash that developed due to that  Due to the urticarial rash she developed post bee sting she received Benadryl, famotidine and steroid shot  After she went home she had a syncopal episode and fell and hit her right hip  Syncope was probably due to the effects of medications recently received  No prior history of any chest pain or palpitations or arrhythmias  Will do an EKG and check TSH and monitor for now

## 2020-08-24 NOTE — DISCHARGE INSTRUCTIONS
Please return with any worsening  Please take the prednisone as prescribed  We suggest you take it with food  You may also use Benadryl which is available over-the-counter as needed should you get any recurrence of your itching or hives  However please return to the emergency room immediately should he get all worse  Do not put the ring back on your hand until the swelling is completely gone  Thank you for choosing the emergency department at [facility], operated by [facility]  We appreciated the opportunity and privilege to address your healthcare needs  We remain available to you should you require additional evaluation or assistance  We value your feedback and would appreciate the opportunity to address anything you identified as an opportunity to improve or where we excelled  If there are colleagues who deserve special recognition, please let us know! We hope you are feeling better soon!

## 2020-08-24 NOTE — ASSESSMENT & PLAN NOTE
Patient has pain in her right hip status post fall  X-ray right hip shows Equivocal right superior pubic ramus fracture  Patient is complaining of pain more in the inferior lateral region of her buttock  Will do a CT pelvis for further evaluation to rule out fracture  Will keep her nonweightbearing of the right lower extremity and consult placed to Orthopedics  Also consult placed to physical therapy and occupational therapy  Tylenol and Toradol for pain control

## 2020-08-24 NOTE — ED NOTES
Patient to go for CT before going upstairs        Niayh Michael, [address] [facility]  08/24/20 3388

## 2020-08-24 NOTE — ED PROVIDER NOTES
History  Chief Complaint   Patient presents with    Syncope     Patient was seen here after being stung by bee and having syncopal episode this morning, pt discahrged and while picking up medications aptient became faint, EMS reports full syncopal episode, hypotension,  and incontinence      This an 25-year-old female who I just saw a short time ago secondary to a bee sting and urticarial rash that she subsequently developed following that  During that episode patient had some lightheadedness and possibly had passed out  However, that history was unclear and patient reported that it was brief at best   She had reported being tremulous after the bee sting and felt that she had became lightheaded secondary to that  While in the emergency department, she was treated with Benadryl and steroids  She had routine blood work performed and she was observed for a period time and was deemed to be doing better and she was discharged home  She also had complained of some right buttocks pain where she had fall and but was ambulatory on this leg  When going to discharge her patient reported that she felt slightly lightheaded when she stood however that is quickly resolved with sitting down and she was able then to be discharged  Apparently upon going home and after her  had picks the prescriptions up at the pharmacy they were attempting to go into the home when the patient became lightheaded and apparently passed out  Patient reports that she believes this was short-lived as well  EMS was called by patient's  and they found the patient to be hypotensive with a systolic blood pressure in the 70s  They initiated ALS and provided IV fluids  Patient reports that she is feeling better now that she is lying down again  Her blood pressure is 145/63  Patient denied to me that she had any chest pain or shortness of breath  EMS report the patient was incontinent of urine        History provided by: Patient  Syncope   Episode history:  Multiple  Most recent episode: Today  Progression:  Unchanged  Chronicity:  New  Context: normal activity and sitting down    Context: not exertion    Witnessed: yes    Relieved by:  Nothing  Associated symptoms: dizziness and weakness    Associated symptoms: no anxiety, no chest pain, no diaphoresis, no difficulty breathing, no fever, no focal weakness, no headaches, no nausea, no palpitations, no shortness of breath and no vomiting    Dizziness:     Severity:  Mild      Prior to Admission Medications   Prescriptions Last Dose Informant Patient Reported? Taking?   predniSONE 20 mg tablet   No No   Sig: Take 2 tablets (40 mg total) by mouth daily      Facility-Administered Medications: None       Past Medical History:   Diagnosis Date    Disease of thyroid gland     Hypercholesterolemia        Past Surgical History:   Procedure Laterality Date    EYE SURGERY Bilateral     TONSILLECTOMY         No family history on file  I have reviewed and agree with the history as documented  E-Cigarette/Vaping    E-Cigarette Use Never User      E-Cigarette/Vaping Substances     Social History     Tobacco Use    Smoking status: Never Smoker    Smokeless tobacco: Never Used   Substance Use Topics    Alcohol use: Not Currently    Drug use: Not on file       Review of Systems   Constitutional: Negative for activity change, diaphoresis and fever  HENT: Negative for congestion, ear pain, rhinorrhea, sinus pressure and sore throat  Eyes: Negative  Respiratory: Negative for cough, chest tightness, shortness of breath and wheezing  Cardiovascular: Positive for syncope  Negative for chest pain, palpitations and leg swelling  Gastrointestinal: Negative for abdominal pain, diarrhea, nausea and vomiting  Endocrine: Negative  Genitourinary: Negative for decreased urine volume, dysuria and flank pain  Musculoskeletal: Negative for arthralgias, back pain and myalgias     Skin: Negative for pallor and rash (Is resolved)  Allergic/Immunologic: Negative  Neurological: Positive for dizziness, weakness and light-headedness  Negative for focal weakness and headaches  Hematological: Negative  Psychiatric/Behavioral: Negative  All other systems reviewed and are negative  Physical Exam  Physical Exam  Vitals signs and nursing note reviewed  Constitutional:       Appearance: She is well-developed  HENT:      Head: Normocephalic and atraumatic  Eyes:      Pupils: Pupils are equal, round, and reactive to light  Neck:      Musculoskeletal: Normal range of motion and neck supple  Cardiovascular:      Rate and Rhythm: Normal rate and regular rhythm  Heart sounds: Normal heart sounds  No murmur  Pulmonary:      Effort: Pulmonary effort is normal  No respiratory distress  Breath sounds: Normal breath sounds  No stridor  No wheezing or rales  Chest:      Chest wall: No tenderness  Abdominal:      General: Bowel sounds are normal  There is no distension  Palpations: Abdomen is soft  Tenderness: There is no abdominal tenderness  There is no guarding or rebound  Musculoskeletal: Normal range of motion  General: Tenderness present  No deformity  Skin:     General: Skin is warm and dry  Coloration: Skin is not pale  Findings: No rash  Neurological:      Mental Status: She is alert and oriented to person, place, and time  Cranial Nerves: No cranial nerve deficit     Psychiatric:         Mood and Affect: Mood normal          Vital Signs  ED Triage Vitals [08/24/20 1347]   Temperature Pulse Respirations Blood Pressure SpO2   98 4 °F (36 9 °C) 93 18 145/63 97 %      Temp Source Heart Rate Source Patient Position - Orthostatic VS BP Location FiO2 (%)   Temporal Monitor Lying Right arm --      Pain Score       --           Vitals:    08/24/20 1347   BP: 145/63   Pulse: 93   Patient Position - Orthostatic VS: Lying         Visual Acuity      ED Medications  Medications   sodium chloride 0 9 % infusion (175 mL/hr Intravenous New Bag 8/24/20 1418)       Diagnostic Studies  Results Reviewed     None                 XR hip/pelv 2-3 vws right if performed   Final Result by Gurmeet Castaneda MD (08/24 1514)      The right hip appears unremarkable  Equivocal right superior pubic ramus fracture  The study was marked in EPIC for significant notification  Workstation performed: [id number]         CT pelvis wo contrast    (Results Pending)              Procedures  ECG 12 Lead Documentation Only    Date/Time: 8/24/2020 2:15 PM  Performed by: Len Wolff DO  Authorized by: Len Wolff DO     ECG reviewed by me, the ED Provider: yes    Patient location:  ED  Previous ECG:     Previous ECG:  Compared to current    Comparison ECG info:  No change from earlier EKG performed at 10:27 a m  Today  Similarity:  No change    Comparison to cardiac monitor: Yes    Interpretation:     Interpretation: normal    Rate:     ECG rate assessment: normal    Rhythm:     Rhythm: sinus rhythm    Ectopy:     Ectopy: none    QRS:     QRS axis:  Normal  ST segments:     ST segments:  Normal  T waves:     T waves: normal               ED Course  ED Course as of Aug 24 1521   Mon Aug 24, 2020   1356 Will obtain x-ray of the area were patient reported some discomfort and pain  Will also repeat an EKG  Patient had blood work just a short time ago in the emergency department during her 1st visit which was normal   Will start IV fluids  Given that this is the 2nd syncopal episode in this elderly patient and this episode was possibly accompanied by bladder incontinence, will refer the patient for observation admission  1511 Patient referred for admission to Medicine  US AUDIT      Most Recent Value   Initial Alcohol Screen: US AUDIT-C    1  How often do you have a drink containing alcohol?  0 Filed at: 08/24/2020 1355   2   How many drinks containing alcohol do you have on a typical day you are drinking? 0 Filed at: 08/24/2020 1355   3a  Male UNDER 65: How often do you have five or more drinks on one occasion? 0 Filed at: 08/24/2020 1355   3b  FEMALE Any Age, or MALE 65+: How often do you have 4 or more drinks on one occassion? 0 Filed at: 08/24/2020 1355   Audit-C Score  0 Filed at: 08/24/2020 1355                  [place]/DAST-10      Most Recent Value   How many times in the past year have you    Used an illegal drug or used a prescription medication for non-medical reasons? Never Filed at: 08/24/2020 1355                                MDM  Number of Diagnoses or Management Options  Syncope: new and requires workup  Diagnosis management comments: Patient presented to the emergency department and a MSE was performed  The patient was evaluated and diagnosed with acute syncope  This is a new issue that will require additional planned work-up and treatment in a hospitalized setting  As may have been required as part of this evaluation, clinical laboratory test, radiology imaging and medical testing (I e  EKG) were ordered as necessitated by the patient's presentation  I independently reviewed these studies, imaging and testing  This patient's case is considered to be a considerable risk secondary to the above listed disease process and poses a threat to the patient's well-being and baseline function  Further in-patient diagnostic testing and management, which may include the administration of parenteral medications, is required           Amount and/or Complexity of Data Reviewed  Clinical lab tests: ordered and reviewed  Tests in the radiology section of CPT®: ordered and reviewed  Discuss the patient with other providers: yes    Risk of Complications, Morbidity, and/or Mortality  Presenting problems: moderate  Diagnostic procedures: moderate  Management options: moderate    Patient Progress  Patient progress: stable        Disposition  Final diagnoses:   Syncope   Closed fracture of other parts of pelvis, initial encounter ([facility] [address] ) - superior rami on right     Time reflects when diagnosis was documented in both MDM as applicable and the Disposition within this note     Time User Action Codes Description Comment    8/24/2020  2:51 PM Carolina Clay Add [R55] Syncope     8/24/2020  3:21 PM Carolina Clay Add [S32 89XA] Closed fracture of other parts of pelvis, initial encounter ([facility] )     8/24/2020  3:21 PM Carolina Clay Modify [S32 89XA] Closed fracture of other parts of pelvis, initial encounter [facility]) superior rami on right      ED Disposition     ED Disposition Condition Date/Time Comment    Admit Stable Mon Aug 24, 2020  2:51 PM           Follow-up Information    None         Patient's Medications   Discharge Prescriptions    No medications on file     No discharge procedures on file      PDMP Review     None          ED Provider  Electronically Signed by           Len Wolff DO  08/24/20 [address] [place], [place] [address]DO  08/24/20 1521

## 2020-08-24 NOTE — ED NOTES
Patient transported to [address] [place] [address], [address] [place] [address]  08/24/20 0998

## 2020-08-25 VITALS
OXYGEN SATURATION: 100 % | RESPIRATION RATE: 13 BRPM | DIASTOLIC BLOOD PRESSURE: 65 MMHG | HEART RATE: 81 BPM | SYSTOLIC BLOOD PRESSURE: 125 MMHG | WEIGHT: 125.22 LBS | TEMPERATURE: 97.9 F

## 2020-08-25 PROBLEM — T14.8XXA INTRAMUSCULAR HEMATOMA: Status: ACTIVE | Noted: 2020-08-25

## 2020-08-25 PROBLEM — E03.9 HYPOTHYROIDISM: Status: ACTIVE | Noted: 2020-08-25

## 2020-08-25 LAB
ANION GAP SERPL CALCULATED.3IONS-SCNC: 7 MMOL/L (ref 4–13)
ATRIAL RATE: 81 BPM
ATRIAL RATE: 86 BPM
BUN SERPL-MCNC: 24 MG/DL (ref 5–25)
CALCIUM SERPL-MCNC: 8 MG/DL (ref 8.3–10.1)
CHLORIDE SERPL-SCNC: 110 MMOL/L (ref 100–108)
CO2 SERPL-SCNC: 27 MMOL/L (ref 21–32)
CREAT SERPL-MCNC: 0.76 MG/DL (ref 0.6–1.3)
ERYTHROCYTE [DISTWIDTH] IN BLOOD BY AUTOMATED COUNT: 13.2 % (ref 11.6–15.1)
GFR SERPL CREATININE-BSD FRML MDRD: 72 ML/MIN/1.73SQ M
GLUCOSE SERPL-MCNC: 142 MG/DL (ref 65–140)
HCT VFR BLD AUTO: 33.9 % (ref 34.8–46.1)
HCT VFR BLD AUTO: 34.9 % (ref 34.8–46.1)
HGB BLD-MCNC: 11.1 G/DL (ref 11.5–15.4)
HGB BLD-MCNC: 11.3 G/DL (ref 11.5–15.4)
MCH RBC QN AUTO: 30.9 PG (ref 26.8–34.3)
MCHC RBC AUTO-ENTMCNC: 32.7 G/DL (ref 31.4–37.4)
MCV RBC AUTO: 94 FL (ref 82–98)
P AXIS: 60 DEGREES
P AXIS: 80 DEGREES
PLATELET # BLD AUTO: 177 THOUSANDS/UL (ref 149–390)
PMV BLD AUTO: 10.3 FL (ref 8.9–12.7)
POTASSIUM SERPL-SCNC: 4 MMOL/L (ref 3.5–5.3)
PR INTERVAL: 156 MS
PR INTERVAL: 158 MS
QRS AXIS: 43 DEGREES
QRS AXIS: 75 DEGREES
QRSD INTERVAL: 72 MS
QRSD INTERVAL: 76 MS
QT INTERVAL: 384 MS
QT INTERVAL: 404 MS
QTC INTERVAL: 459 MS
QTC INTERVAL: 469 MS
RBC # BLD AUTO: 3.59 MILLION/UL (ref 3.81–5.12)
SODIUM SERPL-SCNC: 144 MMOL/L (ref 136–145)
T WAVE AXIS: 25 DEGREES
T WAVE AXIS: 76 DEGREES
TSH SERPL DL<=0.05 MIU/L-ACNC: 0.43 UIU/ML (ref 0.36–3.74)
VENTRICULAR RATE: 81 BPM
VENTRICULAR RATE: 86 BPM
WBC # BLD AUTO: 10.05 THOUSAND/UL (ref 4.31–10.16)

## 2020-08-25 PROCEDURE — 85018 HEMOGLOBIN: CPT | Performed by: FAMILY MEDICINE

## 2020-08-25 PROCEDURE — 80048 BASIC METABOLIC PNL TOTAL CA: CPT | Performed by: FAMILY MEDICINE

## 2020-08-25 PROCEDURE — 97162 PT EVAL MOD COMPLEX 30 MIN: CPT

## 2020-08-25 PROCEDURE — 84443 ASSAY THYROID STIM HORMONE: CPT | Performed by: FAMILY MEDICINE

## 2020-08-25 PROCEDURE — 85014 HEMATOCRIT: CPT | Performed by: FAMILY MEDICINE

## 2020-08-25 PROCEDURE — 85027 COMPLETE CBC AUTOMATED: CPT | Performed by: FAMILY MEDICINE

## 2020-08-25 PROCEDURE — 99217 PR OBSERVATION CARE DISCHARGE MANAGEMENT: CPT | Performed by: FAMILY MEDICINE

## 2020-08-25 PROCEDURE — 97166 OT EVAL MOD COMPLEX 45 MIN: CPT

## 2020-08-25 RX ORDER — ACETAMINOPHEN 325 MG/1
650 TABLET ORAL EVERY 6 HOURS PRN
Qty: 30 TABLET | Refills: 0 | Status: SHIPPED | OUTPATIENT
Start: 2020-08-25

## 2020-08-25 NOTE — PLAN OF CARE
Problem: Potential for Falls  Goal: Patient will remain free of falls  Description: INTERVENTIONS:  - Assess patient frequently for physical needs  -  Identify cognitive and physical deficits and behaviors that affect risk of falls    -  [place] fall precautions as indicated by assessment   - Educate patient/family on patient safety including physical limitations  - Instruct patient to call for assistance with activity based on assessment  - Modify environment to reduce risk of injury  - Consider OT/PT consult to assist with strengthening/mobility  8/25/2020 1252 by Obinna Cain RN  Outcome: Completed  8/25/2020 0951 by Obinna Cain RN  Outcome: Progressing     Problem: CARDIOVASCULAR - ADULT  Goal: Maintains optimal cardiac output and hemodynamic stability  Description: INTERVENTIONS:  - Monitor I/O, vital signs and rhythm  - Monitor for S/S and trends of decreased cardiac output  - Administer and titrate ordered vasoactive medications to optimize hemodynamic stability  - Assess quality of pulses, skin color and temperature  - Assess for signs of decreased coronary artery perfusion  - Instruct patient to report change in severity of symptoms  8/25/2020 1252 by Obinna Cain RN  Outcome: Completed  8/25/2020 0951 by Obinna Cain RN  Outcome: Progressing  Goal: Absence of cardiac dysrhythmias or at baseline rhythm  Description: INTERVENTIONS:  - Continuous cardiac monitoring, vital signs, obtain 12 lead EKG if ordered  - Administer antiarrhythmic and heart rate control medications as ordered  - Monitor electrolytes and administer replacement therapy as ordered  8/25/2020 1252 by Obinna Cain RN  Outcome: Completed  8/25/2020 0951 by Obinna Cain RN  Outcome: Progressing     Problem: SKIN/TISSUE INTEGRITY - ADULT  Goal: Skin integrity remains intact  Description: INTERVENTIONS  - Identify patients at risk for skin breakdown  - Assess and monitor skin integrity  - Assess and monitor nutrition and hydration status  - Monitor labs (i e  albumin)  - Assess for incontinence   - Turn and reposition patient  - Assist with mobility/ambulation  - Relieve pressure over bony prominences  - Avoid friction and shearing  - Provide appropriate hygiene as needed including keeping skin clean and dry  - Evaluate need for skin moisturizer/barrier cream  - Collaborate with interdisciplinary team (i e  Nutrition, Rehabilitation, etc )   - Patient/family teaching  8/25/2020 1252 by Drew Carreno RN  Outcome: Completed  8/25/2020 0951 by Drwe Carreno RN  Outcome: Progressing     Problem: MUSCULOSKELETAL - ADULT  Goal: Maintain or return mobility to safest level of function  Description: INTERVENTIONS:  - Assess patient's ability to carry out ADLs; assess patient's baseline for ADL function and identify physical deficits which impact ability to perform ADLs (bathing, care of mouth/teeth, toileting, grooming, dressing, etc )  - Assess/evaluate cause of self-care deficits   - Assess range of motion  - Assess patient's mobility  - Assess patient's need for assistive devices and provide as appropriate  - Encourage maximum independence but intervene and supervise when necessary  - Involve family in performance of ADLs  - Assess for home care needs following discharge   - Consider OT consult to assist with ADL evaluation and planning for discharge  - Provide patient education as appropriate  8/25/2020 1252 by Drew Carreno RN  Outcome: Completed  8/25/2020 0951 by Drew Carreno RN  Outcome: Progressing  Goal: Maintain proper alignment of affected body part  Description: INTERVENTIONS:  - Support, maintain and protect limb and body alignment  - Provide patient/ family with appropriate education  8/25/2020 1252 by Drew Carreno RN  Outcome: Completed  8/25/2020 0951 by Drew Carreno RN  Outcome: Progressing     Problem: PAIN - ADULT  Goal: Verbalizes/displays adequate comfort level or baseline comfort level  Description: Interventions:  - Encourage patient to monitor pain and request assistance  - Assess pain using appropriate pain scale  - Administer analgesics based on type and severity of pain and evaluate response  - Implement non-pharmacological measures as appropriate and evaluate response  - Consider cultural and social influences on pain and pain management  - Notify physician/advanced practitioner if interventions unsuccessful or patient reports new pain  8/25/2020 1252 by Ira Gibson RN  Outcome: Completed  8/25/2020 0951 by Ira Gibson RN  Outcome: Progressing     Problem: INFECTION - ADULT  Goal: Absence or prevention of progression during hospitalization  Description: INTERVENTIONS:  - Assess and monitor for signs and symptoms of infection  - Monitor lab/diagnostic results  - Monitor all insertion sites, i e  indwelling lines, tubes, and drains  - Monitor endotracheal if appropriate and nasal secretions for changes in amount and color  - [place] appropriate cooling/warming therapies per order  - Administer medications as ordered  - Instruct and encourage patient and family to use good hand hygiene technique  - Identify and instruct in appropriate isolation precautions for identified infection/condition  8/25/2020 1252 by Ira Gibson RN  Outcome: Completed  8/25/2020 0951 by Ira Gibson RN  Outcome: Progressing  Goal: Absence of fever/infection during neutropenic period  Description: INTERVENTIONS:  - Monitor WBC    8/25/2020 1252 by Ira Gibson RN  Outcome: Completed  8/25/2020 0951 by Ira Gibson RN  Outcome: Progressing     Problem: SAFETY ADULT  Goal: Patient will remain free of falls  Description: INTERVENTIONS:  - Assess patient frequently for physical needs  -  Identify cognitive and physical deficits and behaviors that affect risk of falls    -  [place] fall precautions as indicated by assessment   - Educate patient/family on patient safety including physical limitations  - Instruct patient to call for assistance with activity based on assessment  - Modify environment to reduce risk of injury  - Consider OT/PT consult to assist with strengthening/mobility  8/25/2020 1252 by Ovidio Louie RN  Outcome: Completed  8/25/2020 0951 by Ovidio Louie RN  Outcome: Progressing  Goal: Maintain or return to baseline ADL function  Description: INTERVENTIONS:  -  Assess patient's ability to carry out ADLs; assess patient's baseline for ADL function and identify physical deficits which impact ability to perform ADLs (bathing, care of mouth/teeth, toileting, grooming, dressing, etc )  - Assess/evaluate cause of self-care deficits   - Assess range of motion  - Assess patient's mobility; develop plan if impaired  - Assess patient's need for assistive devices and provide as appropriate  - Encourage maximum independence but intervene and supervise when necessary  - Involve family in performance of ADLs  - Assess for home care needs following discharge   - Consider OT consult to assist with ADL evaluation and planning for discharge  - Provide patient education as appropriate  8/25/2020 1252 by Ovidio Louie RN  Outcome: Completed  8/25/2020 0951 by Ovidio Louie RN  Outcome: Progressing  Goal: Maintain or return mobility status to optimal level  Description: INTERVENTIONS:  - Assess patient's baseline mobility status (ambulation, transfers, stairs, etc )    - Identify cognitive and physical deficits and behaviors that affect mobility  - Identify mobility aids required to assist with transfers and/or ambulation (gait belt, sit-to-stand, lift, walker, cane, etc )  - [place] fall precautions as indicated by assessment  - Record patient progress and toleration of activity level on Mobility SBAR; progress patient to next Phase/Stage  - Instruct patient to call for assistance with activity based on assessment  - Consider rehabilitation consult to assist with strengthening/weightbearing, etc   8/25/2020 1252 by Ira Gibson RN  Outcome: Completed  8/25/2020 0951 by Ira Gibson RN  Outcome: Progressing     Problem: DISCHARGE PLANNING  Goal: Discharge to home or other facility with appropriate resources  Description: INTERVENTIONS:  - Identify barriers to discharge w/patient and caregiver  - Arrange for needed discharge resources and transportation as appropriate  - Identify discharge learning needs (meds, wound care, etc )  - Arrange for interpretive services to assist at discharge as needed  - Refer to Case Management Department for coordinating discharge planning if the patient needs post-hospital services based on physician/advanced practitioner order or complex needs related to functional status, cognitive ability, or social support system  8/25/2020 1252 by Ira Gibson RN  Outcome: Completed  8/25/2020 0951 by Ira Gibson RN  Outcome: Progressing     Problem: Knowledge Deficit  Goal: Patient/family/caregiver demonstrates understanding of disease process, treatment plan, medications, and discharge instructions  Description: Complete learning assessment and assess knowledge base    Interventions:  - Provide teaching at level of understanding  - Provide teaching via preferred learning methods  8/25/2020 1252 by Ira Gibson RN  Outcome: Completed  8/25/2020 0951 by Ira Gibson RN  Outcome: Progressing

## 2020-08-25 NOTE — ASSESSMENT & PLAN NOTE
· Left gluteus martine status post fall    CT evidence hemoglobin has improved from 11 1-11 3 pain is improved she is able to ambulate will discharge home with holding of aspirin she is on it for prophylaxis with warm compresses and repeat a CBC another 1 on 08/28

## 2020-08-25 NOTE — PLAN OF CARE
Problem: PHYSICAL THERAPY ADULT  Goal: Performs mobility at highest level of function for planned discharge setting  See evaluation for individualized goals  Description: D/C PT   Equipment Recommended: (none)       See flowsheet documentation for full assessment, interventions and recommendations  Outcome: Completed  Note:       Assessment: Pt is a 80 y o  female seen for PT evaluation s/p admission to [address] [place] [address] on 8/24/2020 with Syncope  Pt reports sitting in hot car for extended time and upon returning home felt lightheaded  Ct pelvis negative for osseous abnormality  Order placed for PT services  Upon evaluation: Pt has PMHx of hypercholesterolemia, thyroid disease  Upon admission, patient presented with hypotension  At this time, patient is presenting at an independent level for functional mobility  She completed bed mobility, transfers and ambulation independent and stairs modified independent with rails  She is at a decreased fall risk as indicated by score of 12/12 on 4 item DGI  She appears to be at her PLOF of independent at this time without further acute care PT services warranted  Will D/C PT services at this time  PT Discharge Recommendation: Return to previous environment with no needs(no needs)     PT - OK to Discharge: Yes    See flowsheet documentation for full assessment

## 2020-08-25 NOTE — UTILIZATION REVIEW
Initial Clinical Review    Admission: Date/Time/Statement:   Admission Orders (From admission, onward)     Ordered        08/24/20 1744  Place in Observation  Once                   Orders Placed This Encounter   Procedures    Place in Observation     Standing Status:   Standing     Number of Occurrences:   1     Order Specific Question:   Admitting Physician     Answer:   Yesika Burnham [[id number]]     Order Specific Question:   Level of Care     Answer:   Med Surg [16]     ED Arrival Information     Expected Arrival Acuity Means of Arrival Escorted By Service Admission Type    - 8/24/2020 13:43 Urgent [facility] [address] [address] Hospitalist Urgent    Arrival Complaint    Near Syncope        Chief Complaint   Patient presents with    Syncope     Patient was seen here after being stung by bee and having syncopal episode this morning, pt discahrged and while picking up medications aptient became faint, EMS reports full syncopal episode, hypotension,  and incontinence      Assessment/Plan: 80year old female to the ED from home via EMS after syncopal episode  Seen tigre in the day in the ED after being stung by a bee, sustaining syncopal episode  At that time, was given Benadryl and steroids, had routine blood work done and was discharged home  Upon entering her home, she had short lived syncopal episode  Admitted under observation for syncope, right hip pain  UPon EMS arrival to the patient, her bp was in the 70s  GIven IV fluid by EMS  Her dizziness, lightheadedness is worse upon standing  * Syncope  Assessment & Plan  Patient states that she was recently in the ED due to having a bee sting and a rash that developed due to that  Due to the urticarial rash she developed post bee sting she received Benadryl, famotidine and steroid shot  After she went home she had a syncopal episode and fell and hit her right hip      Syncope was probably due to the effects of medications recently received    No prior history of any chest pain or palpitations or arrhythmias  Will do an EKG and check TSH and monitor for now      Acute hip pain, right  Assessment & Plan  Patient has pain in her right hip status post fall  X-ray right hip shows Equivocal right superior pubic ramus fracture  Patient is complaining of pain more in the inferior lateral region of her buttock  Will do a CT pelvis for further evaluation to rule out fracture  Will keep her nonweightbearing of the right lower extremity and consult placed to Orthopedics  Also consult placed to physical therapy and occupational therapy  Tylenol and Toradol for pain control      Bee sting  Assessment & Plan  Urticarial  rash due to bee sting has resolved    Monitor for now  ED Triage Vitals   Temperature Pulse Respirations Blood Pressure SpO2   08/24/20 1347 08/24/20 1347 08/24/20 1347 08/24/20 1347 08/24/20 1347   98 4 °F (36 9 °C) 93 18 145/63 97 %      Temp Source Heart Rate Source Patient Position - Orthostatic VS BP Location FiO2 (%)   08/24/20 1347 08/24/20 1347 08/24/20 1347 08/24/20 1347 --   Temporal Monitor Lying Right arm       Pain Score       08/24/20 1624       2          Wt Readings from Last 1 Encounters:   08/24/20 56 8 kg (125 lb 3 5 oz)     Additional Vital Signs:  /Time   Temp   Pulse   Resp   BP   MAP (mmHg)   SpO2   O2 Device   Patient Position - Orthostatic VS    08/25/20 07:03:33   97 9 °F (36 6 °C)   81   13   125/65   85   100 %          08/25/20 05:53:40   97 7 °F (36 5 °C)   83   18   128/68   88   99 %          08/24/20 2328                     None (Room air)       08/24/20 23:11:44   97 5 °F (36 4 °C)   77   18   123/73   90   98 %          08/24/20 16:25:59   97 6 °F (36 4 °C)      18   136/76   96             08/24/20 1530      85   18   120/59   85   98 %      Lying    08/24/20 1347   98 4 °F (36 9 °C)   93   18   145/63      97 %   None        Pertinent Labs/Diagnostic Test Results:   8/24 CT pelvis: Ill-defined intramuscular hematoma in the right gluteus martine muscle  2   No fractures or traumatic malalignment around the sacrum, pelvis, or hips   Questioned lucency on recent radiographs was due to projectional artifact  8/24 Xray hip/pelv: The right hip appears unremarkable  Equivocal right superior pubic ramus fracture         Results from last 7 days   Lab Units 08/25/20  0446 08/24/20  1027   WBC Thousand/uL 10 05 9 43   HEMOGLOBIN g/dL 11 1* 15 0   HEMATOCRIT % 33 9* 45 7   PLATELETS Thousands/uL 177 236   NEUTROS ABS Thousands/µL  --  6 30         Results from last 7 days   Lab Units 08/25/20  0446 08/24/20  1027   SODIUM mmol/L 144 142   POTASSIUM mmol/L 4 0 3 9   CHLORIDE mmol/L 110* 106   CO2 mmol/L 27 29   ANION GAP mmol/L 7 7   BUN mg/dL 24 21   CREATININE mg/dL 0 76 0 83   EGFR ml/min/1 73sq m 72 65   CALCIUM mg/dL 8 0* 9 0     Results from last 7 days   Lab Units 08/24/20  1027   AST U/L 24   ALT U/L 30   ALK PHOS U/L 59   TOTAL PROTEIN g/dL 6 6   ALBUMIN g/dL 3 6   TOTAL BILIRUBIN mg/dL 0 51         Results from last 7 days   Lab Units 08/25/20  0446 08/24/20  1027   GLUCOSE RANDOM mg/dL 142* 122       Results from last 7 days   Lab Units 08/24/20  1027   TROPONIN I ng/mL <0 02     Results from last 7 days   Lab Units 08/25/20  0446   TSH 3RD GENERATON uIU/mL 0 429     ED Treatment:   Medication Administration from 08/24/2020 1343 to 08/24/2020 1621       Date/Time Order Dose Route Action     08/24/2020 1418 sodium chloride 0 9 % infusion 175 mL/hr Intravenous New Bag        Past Medical History:   Diagnosis Date    Disease of thyroid gland     Hypercholesterolemia      Admitting Diagnosis: Syncope [R55]  Acute hip pain, right [M25 551]  [facility] of other parts of pelvis[facility] [S32 89XA]  Age/Sex: 80 y o  female  Admission Orders:  Apply heat to affected area  I/O  SCDS  Non weight bearing  CBC  Scheduled Medications:     Continuous IV Infusions:  sodium chloride, 100 mL/hr, Intravenous, Continuous      PRN Meds:  acetaminophen, 650 mg, Oral, Q6H PRN  calcium carbonate, 1,000 mg, Oral, Daily PRN  ondansetron, 4 mg, Intravenous, Q6H PRN        Network Utilization Review Department  [email or web address]  ATTENTION: Please call with any questions or concerns to [phone] and carefully listen to the prompts so that you are directed to the right person  All voicemails are confidential   Darion Tom all requests for admission clinical reviews, approved or denied determinations and any other requests to dedicated fax number below belonging to the campus where the patient is receiving treatment   List of dedicated fax numbers for the Facilities:  [address] DENIALS (Administrative/Medical Necessity) [phone]   [address] [place] [address] (Maternity/NICU/Pediatrics) [phone] 5400 [facility] [phone]   Cherylene Freud [phone]   [place] Arcos [phone]   [facility] [phone]   [address] [phone]   [facility]  [phone]   [address] [facility] [address] [facility]  2401 [facility] [address] [facility] [phone]

## 2020-08-25 NOTE — PLAN OF CARE
Problem: Potential for Falls  Goal: Patient will remain free of falls  Description: INTERVENTIONS:  - Assess patient frequently for physical needs  -  Identify cognitive and physical deficits and behaviors that affect risk of falls    -  [place] fall precautions as indicated by assessment   - Educate patient/family on patient safety including physical limitations  - Instruct patient to call for assistance with activity based on assessment  - Modify environment to reduce risk of injury  - Consider OT/PT consult to assist with strengthening/mobility  Outcome: Progressing     Problem: CARDIOVASCULAR - ADULT  Goal: Maintains optimal cardiac output and hemodynamic stability  Description: INTERVENTIONS:  - Monitor I/O, vital signs and rhythm  - Monitor for S/S and trends of decreased cardiac output  - Administer and titrate ordered vasoactive medications to optimize hemodynamic stability  - Assess quality of pulses, skin color and temperature  - Assess for signs of decreased coronary artery perfusion  - Instruct patient to report change in severity of symptoms  Outcome: Progressing  Goal: Absence of cardiac dysrhythmias or at baseline rhythm  Description: INTERVENTIONS:  - Continuous cardiac monitoring, vital signs, obtain 12 lead EKG if ordered  - Administer antiarrhythmic and heart rate control medications as ordered  - Monitor electrolytes and administer replacement therapy as ordered  Outcome: Progressing     Problem: SKIN/TISSUE INTEGRITY - ADULT  Goal: Skin integrity remains intact  Description: INTERVENTIONS  - Identify patients at risk for skin breakdown  - Assess and monitor skin integrity  - Assess and monitor nutrition and hydration status  - Monitor labs (i e  albumin)  - Assess for incontinence   - Turn and reposition patient  - Assist with mobility/ambulation  - Relieve pressure over bony prominences  - Avoid friction and shearing  - Provide appropriate hygiene as needed including keeping skin clean and dry  - Evaluate need for skin moisturizer/barrier cream  - Collaborate with interdisciplinary team (i e  Nutrition, Rehabilitation, etc )   - Patient/family teaching  Outcome: Progressing     Problem: MUSCULOSKELETAL - ADULT  Goal: Maintain or return mobility to safest level of function  Description: INTERVENTIONS:  - Assess patient's ability to carry out ADLs; assess patient's baseline for ADL function and identify physical deficits which impact ability to perform ADLs (bathing, care of mouth/teeth, toileting, grooming, dressing, etc )  - Assess/evaluate cause of self-care deficits   - Assess range of motion  - Assess patient's mobility  - Assess patient's need for assistive devices and provide as appropriate  - Encourage maximum independence but intervene and supervise when necessary  - Involve family in performance of ADLs  - Assess for home care needs following discharge   - Consider OT consult to assist with ADL evaluation and planning for discharge  - Provide patient education as appropriate  Outcome: Progressing  Goal: Maintain proper alignment of affected body part  Description: INTERVENTIONS:  - Support, maintain and protect limb and body alignment  - Provide patient/ family with appropriate education  Outcome: Progressing     Problem: PAIN - ADULT  Goal: Verbalizes/displays adequate comfort level or baseline comfort level  Description: Interventions:  - Encourage patient to monitor pain and request assistance  - Assess pain using appropriate pain scale  - Administer analgesics based on type and severity of pain and evaluate response  - Implement non-pharmacological measures as appropriate and evaluate response  - Consider cultural and social influences on pain and pain management  - Notify physician/advanced practitioner if interventions unsuccessful or patient reports new pain  Outcome: Progressing     Problem: INFECTION - ADULT  Goal: Absence or prevention of progression during hospitalization  Description: INTERVENTIONS:  - Assess and monitor for signs and symptoms of infection  - Monitor lab/diagnostic results  - Monitor all insertion sites, i e  indwelling lines, tubes, and drains  - Monitor endotracheal if appropriate and nasal secretions for changes in amount and color  - [place] appropriate cooling/warming therapies per order  - Administer medications as ordered  - Instruct and encourage patient and family to use good hand hygiene technique  - Identify and instruct in appropriate isolation precautions for identified infection/condition  Outcome: Progressing  Goal: Absence of fever/infection during neutropenic period  Description: INTERVENTIONS:  - Monitor WBC    Outcome: Progressing     Problem: SAFETY ADULT  Goal: Patient will remain free of falls  Description: INTERVENTIONS:  - Assess patient frequently for physical needs  -  Identify cognitive and physical deficits and behaviors that affect risk of falls  -  [place] fall precautions as indicated by assessment   - Educate patient/family on patient safety including physical limitations  - Instruct patient to call for assistance with activity based on assessment  - Modify environment to reduce risk of injury  - Consider OT/PT consult to assist with strengthening/mobility  Outcome: Progressing  Goal: Maintain or return to baseline ADL function  Description: INTERVENTIONS:  - Assess patient frequently for physical needs  -  Identify cognitive and physical deficits and behaviors that affect risk of falls    -  [place] fall precautions as indicated by assessment   - Educate patient/family on patient safety including physical limitations  - Instruct patient to call for assistance with activity based on assessment  - Modify environment to reduce risk of injury  - Consider OT/PT consult to assist with strengthening/mobility  Outcome: Progressing  Goal: Maintain or return mobility status to optimal level  Description: INTERVENTIONS:  - Assess patient's ability to carry out ADLs; assess patient's baseline for ADL function and identify physical deficits which impact ability to perform ADLs (bathing, care of mouth/teeth, toileting, grooming, dressing, etc )  - Assess/evaluate cause of self-care deficits   - Assess range of motion  - Assess patient's mobility; develop plan if impaired  - Assess patient's need for assistive devices and provide as appropriate  - Encourage maximum independence but intervene and supervise when necessary  - Involve family in performance of ADLs  - Assess for home care needs following discharge   - Consider OT consult to assist with ADL evaluation and planning for discharge  - Provide patient education as appropriate  Outcome: Progressing     Problem: DISCHARGE PLANNING  Goal: Discharge to home or other facility with appropriate resources  Description: INTERVENTIONS:  - Identify barriers to discharge w/patient and caregiver  - Arrange for needed discharge resources and transportation as appropriate  - Identify discharge learning needs (meds, wound care, etc )  - Arrange for interpretive services to assist at discharge as needed  - Refer to Case Management Department for coordinating discharge planning if the patient needs post-hospital services based on physician/advanced practitioner order or complex needs related to functional status, cognitive ability, or social support system  Outcome: Progressing     Problem: Knowledge Deficit  Goal: Patient/family/caregiver demonstrates understanding of disease process, treatment plan, medications, and discharge instructions  Description: Complete learning assessment and assess knowledge base    Interventions:  - Provide teaching at level of understanding  - Provide teaching via preferred learning methods  Outcome: Progressing

## 2020-08-25 NOTE — DISCHARGE SUMMARY
Discharge- Manoj Matter 1934, 80 y o  female MRN: [id number]    Unit/Bed#: -01 Encounter: [id number]    Primary Care Provider: Judge Soto MD   Date and time admitted to hospital: 8/24/2020  1:43 PM        Intramuscular hematoma  Assessment & Plan  · Left gluteus martine status post fall  CT evidence hemoglobin has improved from 11 1-11 3 pain is improved she is able to ambulate will discharge home with holding of aspirin she is on it for prophylaxis with warm compresses and repeat a CBC another 1 on 08/28    Hypothyroidism  Assessment & Plan  · TSH is normal could continue her home Synthroid    Bee sting  Assessment & Plan  Urticarial  rash due to bee sting has resolved  Monitor for now    Acute hip pain, right  Assessment & Plan  Ruled out by CT-  Ill-defined intramuscular hematoma in the right gluteus martine muscle -4 9 cm - initially she had a drop of hemoglobin from yesterday from 15-11  1- I did repeat another 1 at 11:00 a m  It went up to 11 3  Fluids have been discontinued  Will discharge home  She is able to ambulate without much pain  Continue Tylenol warm compresses  For now will hold off on restarting aspirin she is on it for prophylaxis  Will repeat another CBC on 08/28 on at that time her PCP can decide if he needs to restart her aspirin     * Syncope  Assessment & Plan  Patient states that she was recently in the ED due to having a bee sting and a rash that developed due to that  Due to the urticarial rash she developed post bee sting she received Benadryl, famotidine and steroid shot  After she went home she had a syncopal episode and fell and hit her right hip  Syncope was probably due to the effects of medications recently received  No prior history of any chest pain or palpitations or arrhythmias    EKG reviewed no acute ischemia TSH is normal  Number dizziness          Discharging Physician / Practitioner: Leonel Hong MD  PCP: Judge Soto MD  Admission Date:   Admission Orders (From admission, onward)     Ordered        08/24/20 1744  Place in Observation  Once         08/24/20 1509  Inpatient Admission  Once                   Discharge Date: 08/25/20    Resolved Problems  Date Reviewed: 8/25/2020    None          Consultations During Hospital Stay:  · None    Procedures Performed:   · None    Significant Findings / Test Results:   · CT pelvis-1  Ill-defined intramuscular hematoma in the right gluteus martine muscle      2  No fractures or traumatic malalignment around the sacrum, pelvis, or hips  Questioned lucency on recent radiographs was due to projectional artifact  Incidental Findings:   · None     Test Results Pending at Discharge (will require follow up): · None     Outpatient Tests Requested:  · CBC on 55/03    Complications:  None    Reason for Admission:  Passed out and fell    Hospital Course: Davidson Parada is a 80 y o  female patient who originally presented to the hospital on 8/24/2020 due to what seems like vasovagal syncope secondary to heat and side effect of medication she received earlier for a bee sting  She felt she was having right hip pain there was a questionable fracture on the x-ray with CT was done and revealed actually a good till maximal hematoma 4 9 cm and no fracture  She was admitted with PT OT monitoring hemoglobin hemoglobin from admission fell 4 g to 11 1 I stop the fluids and I did monitor and repeat another hemoglobin and actually went up to 11 3 she is able to ambulate without any assistance her pain is improved she is medically clear to be discharged home with warm packs and Tylenol if needed  I will discontinue her aspirin for now she is on it for prophylaxis  Will repeat another CBC on Friday, but as stated there is no further trending down hemoglobin  EKG without ischemia TSH is normal      Please see above list of diagnoses and related plan for additional information       Condition at Discharge: stable Discharge Day Visit / Exam:     Subjective:  Patient seen and examined denies any dizziness denies any chest pain or shortness of breath, right hip pain has improved she wants to go home  Vitals: Blood Pressure: 125/65 (08/25/20 0703)  Pulse: 81 (08/25/20 0703)  Temperature: 97 9 °F (36 6 °C) (08/25/20 0703)  Temp Source: Oral (08/24/20 1625)  Respirations: 13 (08/25/20 0703)  Weight - Scale: 56 8 kg (125 lb 3 5 oz) (08/24/20 1347)  SpO2: 100 % (08/25/20 0703)  Exam:   Physical Exam  Constitutional:       Appearance: Normal appearance  HENT:      Head: Normocephalic and atraumatic  Mouth/Throat:      Mouth: Mucous membranes are moist    Eyes:      Extraocular Movements: Extraocular movements intact  Pupils: Pupils are equal, round, and reactive to light  Cardiovascular:      Rate and Rhythm: Normal rate and regular rhythm  Pulses: Normal pulses  Heart sounds: Normal heart sounds  Pulmonary:      Effort: Pulmonary effort is normal       Breath sounds: Normal breath sounds  Abdominal:      General: Bowel sounds are normal       Palpations: Abdomen is soft  Musculoskeletal:         General: No swelling  Skin:     General: Skin is warm and dry  Neurological:      General: No focal deficit present  Mental Status: She is alert and oriented to person, place, and time  Mental status is at baseline  Psychiatric:         Mood and Affect: Mood normal        Discussion with Family:  Son    Discharge instructions/Information to patient and family:   See after visit summary for information provided to patient and family  Provisions for Follow-Up Care:  See after visit summary for information related to follow-up care and any pertinent home health orders        Disposition:     Home    For Discharges to [facility]:   · Not Applicable to this Patient - Not Applicable to this Patient    Planned Readmission: no     Discharge Statement:  I spent >35 minutes discharging the patient  This time was spent on the day of discharge  I had direct contact with the patient on the day of discharge  Greater than 50% of the total time was spent examining patient, answering all patient questions, arranging and discussing plan of care with patient as well as directly providing post-discharge instructions  Additional time then spent on discharge activities  Discharge Medications:  See after visit summary for reconciled discharge medications provided to patient and family        ** Please Note: This note has been constructed using a voice recognition system **

## 2020-08-25 NOTE — OCCUPATIONAL THERAPY NOTE
Occupational Therapy Evaluation     Patient Name: Renaldo Martínez  HPBML'Q Date: 8/25/2020  Problem List  Principal Problem:    Syncope  Active Problems:    Acute hip pain, right    Bee sting    Past Medical History  Past Medical History:   Diagnosis Date    Disease of thyroid gland     Hypercholesterolemia      Past Surgical History  Past Surgical History:   Procedure Laterality Date    EYE SURGERY Bilateral     TONSILLECTOMY             08/25/20 1019   Note Type   Note type Eval only   Restrictions/Precautions   Weight Bearing Precautions Per Order No   RLE Weight Bearing Per Order   (Pt is WBAT RLE per discussion with Dr Lidya Leo)   Pain Assessment   Pain Assessment Tool 0-10   Pain Score No Pain   Home Living   Type of Home House  (4+6+1 PRAVEEN B HR)   Home Layout Two level;Bed/bath upstairs; Laundry in basement  (FF to 2nd floor and basement R HR)   Bathroom Shower/Tub Tub/shower unit   Bathroom Toilet Standard   Bathroom Equipment Grab bars in shower   Additional Comments Pt reports living in a 2 story home with  4+6+1 PRAVEEN B HR  FF to 2nd floor and basement with R HR  Pt ambulates with no AD PTA  Prior Function   Level of [place] Independent with ADLs and functional mobility   Lives With Spouse   ADL Assistance Independent   IADLs Independent   Falls in the last 6 months 1 to 4  (no driving)   Comments Pt reports being completly independent with ADLs, IADLs, and functional ambulation  Pt does not drive, her  drives and completes community mobility with her  ADL   Where Assessed Edge of bed   Grooming Assistance 7  Independent   UB Dressing Assistance 7  Independent   LB Dressing Assistance 7  Independent   Additional Comments Pt completing ADL tasks while seated at EOB   Pt demonstrating ability to complete UB/LB Dressing @ I with no difficulty noted and no LOB   Bed Mobility   Supine to Sit 7  Independent   Additional Comments supine>sit @ I with HOB flat and no bedrails   Transfers   Sit to Stand 7  Independent   Stand to Sit 7  Independent   Stand pivot 7  Independent   Additional Comments Pt completing functional transfers with no AD for UB support  Pt with good safety awareness and no LOB noted   Balance   Static Sitting Normal   Dynamic Sitting Good   Static Standing Fair +   Dynamic Standing Fair +   Activity Tolerance   Activity Tolerance Patient tolerated treatment well   Medical Staff Made Aware Spoke with PT, Chandler Mrofin   Nurse Made Aware Spoke with RN   RUE Assessment   RUE Assessment WNL   LUE Assessment   LUE Assessment WNL   Hand Function   Gross Motor Coordination Functional   Fine Motor Coordination Functional   Sensation   Light Touch No apparent deficits   Cognition   Overall Cognitive Status WFL   Arousal/Participation Alert; Responsive; Cooperative   Attention Within functional limits   Orientation Level Oriented X4   Memory Within functional limits   Following Commands Follows all commands and directions without difficulty   Assessment   Prognosis Good   Assessment Pt is an 79 y/o F admitted to [facility] under observation 8/24/2020 d/t experiencing a syncopal episode at home after getting a bee sting then sitting in a hot car for ~10 minutes  Dx: syncope  Pt with PMHx impacting performance during functional tasks including: hypercholesterolemia  Pt reports living at home in a 2 story home with 4+6+1 PRAVEEN B HR  FF to 2nd floor and basement with R HR  Full bed/bath on 2nd floor  No bathroom on 1st floor  Laundry in basement  Pt reports ambulating with no AD PTA and completing all ADLS, IADLs, and functional ambulation @ I  Pt does not drive, completed community mobility with her  who does the driving  On evaluation, Pt A&Ox4  Pt completing bed mobility @ I  UB/LB dressing @ I with no difficulty or LOB noted  Pt completing functional transfers and short distance ambulation with no AD @ I  Pt BUR ROM and MS WNL  Pt scoring 100/100 on Barthel Index   Pt appears to be performing ADLs and functional tasks @ PLOF and does not require any further OT services at this time  Pt has no concerns for returning home with her   D/c OT services effective 8/25/2020  If new conerns arise, please re-consult      Plan   OT Frequency Eval only   Recommendation   Recommendation   (no OT needs at this time)   OT Discharge Recommendation Return to previous environment with no needs   Barthel Index   Feeding 10   Bathing 5   Grooming Score 5   Dressing Score 10   Bladder Score 10   Bowels Score 10   Toilet Use Score 10   Transfers (Bed/Chair) Score 15   Mobility (Level Surface) Score 15   Stairs Score 10   Barthel Index Score 100     Danette Paredes, OTR/L

## 2020-08-25 NOTE — PHYSICAL THERAPY NOTE
PHYSICAL THERAPY EVALUATION  NAME:  Susan Francisco  DATE: 08/25/20    AGE:   80 y o  Mrn:   [id number]  ADMIT DX:  Syncope [R55]  Acute hip pain, right [M25 551]  [facility] other parts of [facility] [S32 89XA]    Past Medical History:   Diagnosis Date    Disease of thyroid gland     Hypercholesterolemia      Length Of Stay: 1  Performed at least 2 patient identifiers during session: Name and Birthday  PHYSICAL THERAPY EVALUATION :      08/25/20 1018   Note Type   Note type Eval only   Pain Assessment   Pain Assessment Tool 0-10   Pain Score No Pain   Home Living   Type of Home House  (4+6+1 PRAVEEN B HRs )   Home Layout Two level;Bed/bath upstairs; Laundry in basement  (FF with R HR to 2nd floor and from basement)   Bathroom Shower/Tub Tub/shower unit   Bathroom Toilet Standard   Bathroom Equipment Grab bars in shower   Additional Comments Pt reports living in a [facility] with PRAVEEN with B HRs and FF with R HR to 2nd floor  Reports being independent without AD PTA  Prior Function   Level of [place] Independent with ADLs and functional mobility   Lives With Spouse   ADL Assistance Independent   IADLs Independent   Falls in the last 6 months 1 to 4  (no driving)   Restrictions/Precautions   Weight Bearing Precautions Per Order No   RLE Weight Bearing Per Order   (WBAT R LE per discussion with Dr Abimbola Watkins)   General   Additional Pertinent History CT pelvis showing  Ill-defined intramuscular hematoma in the right gluteus martine muscle  No fractures  xray intially showing R superior pubic ramus fx, however no fx identified on CT  okay to see patient WBAT R LE per Dr Abimbola Watkins with ortho consult cancelled      Cognition   Orientation Level Oriented X4   Following Commands Follows all commands and directions without difficulty   RLE Assessment   RLE Assessment WFL  (strength 4+/5)   LLE Assessment   LLE Assessment WFL  (strength 4+/5)   Coordination   Movements are Fluid and Coordinated 1 Light Touch   RLE Light Touch Grossly intact   LLE Light Touch Grossly intact   Bed Mobility   Supine to Sit 7  Independent   Additional Comments HOB flat without bedrails independent  Transfers   Sit to Stand 7  Independent   Stand to Sit 7  Independent   Stand pivot 7  Independent   Additional Comments no AD for transfers  completed independently without difficulty  Ambulation/Elevation   Gait pattern   (varied JENA at times  )   Gait Assistance 7  Independent   Assistive Device None   Distance 400' without AD independent without LOB  varied JENA at times, but no LOB  Stair Management Assistance 6  Modified independent   Additional items Increased time required   Stair Management Technique Two rails; One rail R;Step to pattern; Alternating pattern  (step to pattern lead R LE down and L LE up)   Number of Stairs 5  (2, 6" steps and 3, 4" steps)   Balance   Static Sitting Normal   Dynamic Sitting Normal   Static Standing Normal   Dynamic Standing Good   Ambulatory Good   Activity Tolerance   Activity Tolerance Patient tolerated treatment well   Medical Staff Made Aware Danette TOLBERT   Nurse Made Aware RNHarvinder   Goals   Patient Goals "Go home"   Plan   PT Frequency   (D/C PT)   Recommendation   PT Discharge Recommendation Return to previous environment with no needs  (no needs)   Equipment Recommended   (none)   PT - OK to Discharge Yes   Additional Comments [facility] 6 clicks 06/90     4 Item Dynamic Gait Index without AD  3/3 Gait level surface  3/3 Change in gait speed  3/3 Gait with horizontal head turns  3/3 Gait with vertical head turns  12/12 total score (less than 10/12 indicates increased risk of falls in elderly)    (Please find full objective findings from PT assessment regarding body systems outlined above)  Assessment: Pt is a 80 y o  female seen for PT evaluation s/p admission to [address] [place] [address] on 8/24/2020 with Syncope   Pt reports sitting in hot car for extended time and upon returning home felt lightheaded  Ct pelvis negative for osseous abnormality  Order placed for PT services  Upon evaluation: Pt has PMHx of hypercholesterolemia, thyroid disease  Upon admission, patient presented with hypotension  At this time, patient is presenting at an independent level for functional mobility  She completed bed mobility, transfers and ambulation independent and stairs modified independent with rails  She is at a decreased fall risk as indicated by score of 12/12 on 4 item DGI  She appears to be at her PLOF of independent at this time without further acute care PT services warranted  Will D/C PT services at this time          Kimmy Perez, PT,DPT

## 2020-08-25 NOTE — SOCIAL WORK
Current LOS 1 day  No CM consult  CM completed chart review  Pt OBS status d/t syncope  CM met with patient at the bedside,baseline information was obtained  CM discussed the role of CM in helping the patient develop a discharge plan and assist the patient in carry out their plan  Pt lives with her  and son Yeny Najera in a 2 SH, 13 PRAVEEN, and ___ to the 2nd floor bedroom and bathroom  Home    Pt completes ADLs independently  Pt ambulates independently  No DME  Pt is retired  Pt does not drive  She relies on her , family, or friends for transportation  Pt has no hx of STR or HHC  Pt denies hx of MH or D&A tx  PCP: [facility])   Has an appointment on Wednesday  CM will assist to cancel/reschedule if pt remains IP  Preferred Pharmacy: [facility] on [address] [facility] [address]: Janet Muñoz () [phone]  Pt states her 3 sons [facility] and Tricia Carmelo ([address] [place] [address]) share POA  Pt family/friends are able to transport pt home at time of d/c      CM reviewed OBS notice with pt (none on chart)  Pt verbalized understanding and signed form for chart  CM provided pt with copy of form  CM will continue to follow pt medical course and assist with d/c planning as needed

## 2020-08-25 NOTE — NURSING NOTE
Discharge instructions and medications reviewed with pt  IV d/c'd  No written scripts  No questions or concerns

## 2020-08-25 NOTE — ASSESSMENT & PLAN NOTE
Patient states that she was recently in the ED due to having a bee sting and a rash that developed due to that  Due to the urticarial rash she developed post bee sting she received Benadryl, famotidine and steroid shot  After she went home she had a syncopal episode and fell and hit her right hip  Syncope was probably due to the effects of medications recently received  No prior history of any chest pain or palpitations or arrhythmias    EKG reviewed no acute ischemia TSH is normal  Number dizziness

## 2020-08-25 NOTE — ASSESSMENT & PLAN NOTE
Ruled out by CT-  Ill-defined intramuscular hematoma in the right gluteus martine muscle -4 9 cm - initially she had a drop of hemoglobin from yesterday from 15-11  1- I did repeat another 1 at 11:00 a m  It went up to 11 3    Fluids have been discontinued  Will discharge home  She is able to ambulate without much pain  Continue Tylenol warm compresses  For now will hold off on restarting aspirin she is on it for prophylaxis  Will repeat another CBC on 08/28 on at that time her PCP can decide if he needs to restart her aspirin

## 2020-08-25 NOTE — PLAN OF CARE
Problem: Potential for Falls  Goal: Patient will remain free of falls  Description: INTERVENTIONS:  - Assess patient frequently for physical needs  -  Identify cognitive and physical deficits and behaviors that affect risk of falls    -  [place] fall precautions as indicated by assessment   - Educate patient/family on patient safety including physical limitations  - Instruct patient to call for assistance with activity based on assessment  - Modify environment to reduce risk of injury  - Consider OT/PT consult to assist with strengthening/mobility  Outcome: Progressing     Problem: CARDIOVASCULAR - ADULT  Goal: Maintains optimal cardiac output and hemodynamic stability  Description: INTERVENTIONS:  - Monitor I/O, vital signs and rhythm  - Monitor for S/S and trends of decreased cardiac output  - Administer and titrate ordered vasoactive medications to optimize hemodynamic stability  - Assess quality of pulses, skin color and temperature  - Assess for signs of decreased coronary artery perfusion  - Instruct patient to report change in severity of symptoms  Outcome: Progressing  Goal: Absence of cardiac dysrhythmias or at baseline rhythm  Description: INTERVENTIONS:  - Continuous cardiac monitoring, vital signs, obtain 12 lead EKG if ordered  - Administer antiarrhythmic and heart rate control medications as ordered  - Monitor electrolytes and administer replacement therapy as ordered  Outcome: Progressing     Problem: SKIN/TISSUE INTEGRITY - ADULT  Goal: Skin integrity remains intact  Description: INTERVENTIONS  - Identify patients at risk for skin breakdown  - Assess and monitor skin integrity  - Assess and monitor nutrition and hydration status  - Monitor labs (i e  albumin)  - Assess for incontinence   - Turn and reposition patient  - Assist with mobility/ambulation  - Relieve pressure over bony prominences  - Avoid friction and shearing  - Provide appropriate hygiene as needed including keeping skin clean and dry  - Evaluate need for skin moisturizer/barrier cream  - Collaborate with interdisciplinary team (i e  Nutrition, Rehabilitation, etc )   - Patient/family teaching  Outcome: Progressing     Problem: MUSCULOSKELETAL - ADULT  Goal: Maintain or return mobility to safest level of function  Description: INTERVENTIONS:  - Assess patient's ability to carry out ADLs; assess patient's baseline for ADL function and identify physical deficits which impact ability to perform ADLs (bathing, care of mouth/teeth, toileting, grooming, dressing, etc )  - Assess/evaluate cause of self-care deficits   - Assess range of motion  - Assess patient's mobility  - Assess patient's need for assistive devices and provide as appropriate  - Encourage maximum independence but intervene and supervise when necessary  - Involve family in performance of ADLs  - Assess for home care needs following discharge   - Consider OT consult to assist with ADL evaluation and planning for discharge  - Provide patient education as appropriate  Outcome: Progressing  Goal: Maintain proper alignment of affected body part  Description: INTERVENTIONS:  - Support, maintain and protect limb and body alignment  - Provide patient/ family with appropriate education  Outcome: Progressing     Problem: PAIN - ADULT  Goal: Verbalizes/displays adequate comfort level or baseline comfort level  Description: Interventions:  - Encourage patient to monitor pain and request assistance  - Assess pain using appropriate pain scale  - Administer analgesics based on type and severity of pain and evaluate response  - Implement non-pharmacological measures as appropriate and evaluate response  - Consider cultural and social influences on pain and pain management  - Notify physician/advanced practitioner if interventions unsuccessful or patient reports new pain  Outcome: Progressing     Problem: INFECTION - ADULT  Goal: Absence or prevention of progression during hospitalization  Description: INTERVENTIONS:  - Assess and monitor for signs and symptoms of infection  - Monitor lab/diagnostic results  - Monitor all insertion sites, i e  indwelling lines, tubes, and drains  - Monitor endotracheal if appropriate and nasal secretions for changes in amount and color  - [place] appropriate cooling/warming therapies per order  - Administer medications as ordered  - Instruct and encourage patient and family to use good hand hygiene technique  - Identify and instruct in appropriate isolation precautions for identified infection/condition  Outcome: Progressing  Goal: Absence of fever/infection during neutropenic period  Description: INTERVENTIONS:  - Monitor WBC    Outcome: Progressing     Problem: SAFETY ADULT  Goal: Patient will remain free of falls  Description: INTERVENTIONS:  - Assess patient frequently for physical needs  -  Identify cognitive and physical deficits and behaviors that affect risk of falls    -  [place] fall precautions as indicated by assessment   - Educate patient/family on patient safety including physical limitations  - Instruct patient to call for assistance with activity based on assessment  - Modify environment to reduce risk of injury  - Consider OT/PT consult to assist with strengthening/mobility  Outcome: Progressing  Goal: Maintain or return to baseline ADL function  Description: INTERVENTIONS:  -  Assess patient's ability to carry out ADLs; assess patient's baseline for ADL function and identify physical deficits which impact ability to perform ADLs (bathing, care of mouth/teeth, toileting, grooming, dressing, etc )  - Assess/evaluate cause of self-care deficits   - Assess range of motion  - Assess patient's mobility; develop plan if impaired  - Assess patient's need for assistive devices and provide as appropriate  - Encourage maximum independence but intervene and supervise when necessary  - Involve family in performance of ADLs  - Assess for home care needs following discharge   - Consider OT consult to assist with ADL evaluation and planning for discharge  - Provide patient education as appropriate  Outcome: Progressing  Goal: Maintain or return mobility status to optimal level  Description: INTERVENTIONS:  - Assess patient's baseline mobility status (ambulation, transfers, stairs, etc )    - Identify cognitive and physical deficits and behaviors that affect mobility  - Identify mobility aids required to assist with transfers and/or ambulation (gait belt, sit-to-stand, lift, walker, cane, etc )  - [place] fall precautions as indicated by assessment  - Record patient progress and toleration of activity level on Mobility SBAR; progress patient to next Phase/Stage  - Instruct patient to call for assistance with activity based on assessment  - Consider rehabilitation consult to assist with strengthening/weightbearing, etc   Outcome: Progressing     Problem: DISCHARGE PLANNING  Goal: Discharge to home or other facility with appropriate resources  Description: INTERVENTIONS:  - Identify barriers to discharge w/patient and caregiver  - Arrange for needed discharge resources and transportation as appropriate  - Identify discharge learning needs (meds, wound care, etc )  - Arrange for interpretive services to assist at discharge as needed  - Refer to Case Management Department for coordinating discharge planning if the patient needs post-hospital services based on physician/advanced practitioner order or complex needs related to functional status, cognitive ability, or social support system  Outcome: Progressing     Problem: Knowledge Deficit  Goal: Patient/family/caregiver demonstrates understanding of disease process, treatment plan, medications, and discharge instructions  Description: Complete learning assessment and assess knowledge base    Interventions:  - Provide teaching at level of understanding  - Provide teaching via preferred learning methods  Outcome: Progressing

## 2020-08-28 NOTE — PHYSICIAN ADVISOR
Current patient class: Observation  The patient is currently on Hospital Day: 2 at / [facility]      The patient was admitted to the hospital  on N/A at N/A for the following diagnosis:  Syncope [R55]  Acute hip pain, right [M25 551]  Closed fracture of other parts of pelvis, initial encounter ([facility] ) [S32 [id number]]     After review of the relevant documentation, labs, vital signs and test results, this is a OBSERVATION case  The patient class was changed to   observation by the provider  Based on the diagnosis and planned length of stay, observation class was appropriate  In this particular case the patient was admitted to the hospital as an inpatient by the ED  The provider corrected the class to observation and the patient received notification        Rationale is as follows: The patient is a 80 yrs   Female who presented to the ED at 8/24/2020  1:43 PM with a chief complaint of Syncope (Patient was seen here after being stung by bee and having syncopal episode this morning, pt discahrged and while picking up medications aptient became faint, EMS reports full syncopal episode, hypotension,  and incontinence )  Patient was recently discharged from the ER after treatment for a bee sting  She received pepcid, benadryl and steroid injection  She subsequently went home and had a syncopal episode injuring her right hip and buttocks  She developed an intramuscular hematoma of the right and left buttocks  Her hgb decreased from 15 on admission down to 11 1  Follow up hgb prior to discharge was stable at 11 3  Her syncope was thought to be medication related from medications received for bee sting  She was evaluated by PT and deemed stable for discharge on hospital day 2  She is observation appropriate        The patients vitals on arrival were   ED Triage Vitals   Temperature Pulse Respirations Blood Pressure SpO2   08/24/20 1347 08/24/20 1347 08/24/20 1347 08/24/20 1347 08/24/20 1347 98 4 °F (36 9 °C) 93 18 145/63 97 %      Temp Source Heart Rate Source Patient Position - Orthostatic VS BP Location FiO2 (%)   08/24/20 1347 08/24/20 1347 08/24/20 1347 08/24/20 1347 --   Temporal Monitor Lying Right arm       Pain Score       08/24/20 1624       2           Past Medical History:   Diagnosis Date    Disease of thyroid gland     Hypercholesterolemia      Past Surgical History:   Procedure Laterality Date    EYE SURGERY Bilateral     TONSILLECTOMY             Consults have been placed to:   None    Vitals:    08/24/20 1625 08/24/20 2311 08/25/20 0553 08/25/20 0703   BP: 136/76 123/73 128/68 125/65   BP Location:       Pulse:  77 83 81   Resp: 18 18 18 13   Temp: 97 6 °F (36 4 °C) 97 5 °F (36 4 °C) 97 7 °F (36 5 °C) 97 9 °F (36 6 °C)   TempSrc: Oral      SpO2:  98% 99% 100%   Weight:           Most recent labs:    No results for input(s): WBC, HGB, HCT, PLT, K, NA, CALCIUM, BUN, CREATININE, LIPASE, AMYLASE, INR, TROPONINI, CKTOTAL, AST, ALT, ALKPHOS, BILITOT in the last 72 hours  Scheduled Meds:  Continuous Infusions:No current facility-administered medications for this encounter  PRN Meds:      Surgical procedures (if appropriate):

## 2022-02-28 ENCOUNTER — APPOINTMENT (EMERGENCY)
Dept: RADIOLOGY | Facility: HOSPITAL | Age: 87
End: 2022-02-28
Payer: MEDICARE

## 2022-02-28 ENCOUNTER — HOSPITAL ENCOUNTER (EMERGENCY)
Facility: HOSPITAL | Age: 87
Discharge: HOME | End: 2022-02-28
Attending: EMERGENCY MEDICINE | Admitting: EMERGENCY MEDICINE
Payer: MEDICARE

## 2022-02-28 VITALS
RESPIRATION RATE: 18 BRPM | DIASTOLIC BLOOD PRESSURE: 90 MMHG | BODY MASS INDEX: 23.92 KG/M2 | HEART RATE: 83 BPM | TEMPERATURE: 98.4 F | HEIGHT: 62 IN | SYSTOLIC BLOOD PRESSURE: 208 MMHG | WEIGHT: 130 LBS | OXYGEN SATURATION: 98 %

## 2022-02-28 DIAGNOSIS — J06.9 VIRAL URI WITH COUGH: Primary | ICD-10-CM

## 2022-02-28 DIAGNOSIS — J02.9 VIRAL PHARYNGITIS: ICD-10-CM

## 2022-02-28 LAB — S PYO DNA THROAT QL NAA+PROBE: NOT DETECTED

## 2022-02-28 PROCEDURE — 99283 EMERGENCY DEPT VISIT LOW MDM: CPT

## 2022-02-28 PROCEDURE — 99285 EMERGENCY DEPT VISIT HI MDM: CPT | Performed by: EMERGENCY MEDICINE

## 2022-02-28 PROCEDURE — 87651 STREP A DNA AMP PROBE: CPT | Performed by: EMERGENCY MEDICINE

## 2022-02-28 PROCEDURE — 71045 X-RAY EXAM CHEST 1 VIEW: CPT

## 2022-02-28 NOTE — ED PROVIDER NOTES
History  Chief Complaint   Patient presents with    Cough     Cough for 2 weeks, congestion  Today had small amount of bleeding from nose  One week of cough and sore throat  No fevers  No nausea or vomiting  No shortness of breath or chest pain  No ear pain  No other complaints  History provided by:  Patient   used: No    Cough  Sputum characteristics:  Nondescript  Severity:  Moderate  Onset quality:  Gradual  Duration:  1 week  Timing:  Constant  Chronicity:  New  Smoker: no    Relieved by:  Nothing  Worsened by:  Nothing  Ineffective treatments:  None tried  Associated symptoms: sore throat    Associated symptoms: no chest pain, no chills, no diaphoresis, no ear fullness, no ear pain, no eye discharge, no fever, no headaches, no myalgias, no rash, no rhinorrhea, no shortness of breath, no sinus congestion and no wheezing        Prior to Admission Medications   Prescriptions Last Dose Informant Patient Reported?  Taking?   acetaminophen (TYLENOL) 325 mg tablet   No No   Sig: Take 2 tablets (650 mg total) by mouth every 6 (six) hours as needed for mild pain or fever   calcium citrate-vitamin D (CITRACAL+D) 315-200 MG-UNIT per tablet  Self Yes No   Sig: Take 2 tablets by mouth daily   latanoprost (XALATAN) 0 005 % ophthalmic solution  Self Yes No   Sig: Administer 1 drop to both eyes daily at bedtime   levothyroxine 75 mcg tablet  Self Yes No   Sig: Take 75 mcg by mouth daily   multivitamin-iron-minerals-folic acid (CENTRUM) chewable tablet  Self Yes No   Sig: Chew 1 tablet daily   simvastatin (ZOCOR) 40 mg tablet  Self Yes No   Sig: Take 40 mg by mouth daily at bedtime      Facility-Administered Medications: None       Past Medical History:   Diagnosis Date    Disease of thyroid gland     Hypercholesterolemia        Past Surgical History:   Procedure Laterality Date    EYE SURGERY Bilateral     TONSILLECTOMY         Family History   Problem Relation Age of Onset    Hypertension Father      I have reviewed and agree with the history as documented  E-Cigarette/Vaping    E-Cigarette Use Never User      E-Cigarette/Vaping Substances     Social History     Tobacco Use    Smoking status: Never Smoker    Smokeless tobacco: Never Used   Vaping Use    Vaping Use: Never used   Substance Use Topics    Alcohol use: Not Currently    Drug use: Never       Review of Systems   Constitutional: Negative for chills, diaphoresis and fever  HENT: Positive for sore throat  Negative for ear pain, hearing loss, rhinorrhea, trouble swallowing and voice change  Eyes: Negative for pain and discharge  Respiratory: Positive for cough  Negative for shortness of breath and wheezing  Cardiovascular: Negative for chest pain and palpitations  Gastrointestinal: Negative for abdominal pain, blood in stool, constipation, diarrhea, nausea and vomiting  Genitourinary: Negative for dysuria, flank pain, frequency and hematuria  Musculoskeletal: Negative for joint swelling, myalgias, neck pain and neck stiffness  Skin: Negative for rash and wound  Neurological: Negative for dizziness, seizures, syncope, facial asymmetry and headaches  Psychiatric/Behavioral: Negative for hallucinations, self-injury and suicidal ideas  All other systems reviewed and are negative  Physical Exam  Physical Exam  Vitals and nursing note reviewed  Constitutional:       General: She is not in acute distress  Appearance: She is well-developed  HENT:      Head: Normocephalic and atraumatic  Right Ear: External ear normal       Left Ear: External ear normal       Mouth/Throat:      Lips: Pink  No lesions  Mouth: Mucous membranes are moist  No oral lesions or angioedema  Dentition: No gingival swelling or gum lesions  Tongue: No lesions  Tongue does not deviate from midline  Palate: No mass and lesions  Pharynx: Uvula midline   No pharyngeal swelling, oropharyngeal exudate, posterior oropharyngeal erythema or uvula swelling  Tonsils: No tonsillar exudate or tonsillar abscesses  Eyes:      General: No scleral icterus  Right eye: No discharge  Left eye: No discharge  Extraocular Movements: Extraocular movements intact  Conjunctiva/sclera: Conjunctivae normal    Neck:      Trachea: Trachea and phonation normal       Comments: Palpable bilateral anterior cervical adenopathy is nontender  Cardiovascular:      Rate and Rhythm: Normal rate and regular rhythm  Heart sounds: Normal heart sounds  No murmur heard  Pulmonary:      Effort: Pulmonary effort is normal       Breath sounds: Normal breath sounds  No wheezing or rales  Abdominal:      General: Bowel sounds are normal  There is no distension  Palpations: Abdomen is soft  Tenderness: There is no abdominal tenderness  There is no guarding or rebound  Musculoskeletal:         General: No deformity  Normal range of motion  Cervical back: Normal range of motion and neck supple  No rigidity  Normal range of motion  Lymphadenopathy:      Cervical: Cervical adenopathy present  Right cervical: Superficial cervical adenopathy present  Left cervical: Superficial cervical adenopathy present  Skin:     General: Skin is warm and dry  Findings: No rash  Neurological:      General: No focal deficit present  Mental Status: She is alert and oriented to person, place, and time  Cranial Nerves: No cranial nerve deficit  Psychiatric:         Mood and Affect: Mood normal          Behavior: Behavior normal          Thought Content:  Thought content normal          Judgment: Judgment normal          Vital Signs  ED Triage Vitals [02/28/22 1801]   Temperature Pulse Respirations Blood Pressure SpO2   98 4 °F (36 9 °C) 83 18 (!) 208/90 98 %      Temp Source Heart Rate Source Patient Position - Orthostatic VS BP Location FiO2 (%)   Oral Monitor Sitting Right arm -- Pain Score       No Pain           Vitals:    22 1801   BP: (!) 208/90   Pulse: 83   Patient Position - Orthostatic VS: Sitting         Visual Acuity      ED Medications  Medications - No data to display    Diagnostic Studies  Results Reviewed     Procedure Component Value Units Date/Time    Strep A PCR [[id number]]  (Normal) Collected: 22    Lab Status: Final result Specimen: Throat Updated: 22     STREP A PCR Not Detected                 XR chest portable   ED Interpretation by Chao Bowen MD ([id number])   No acute finding                 Procedures  Procedures         ED Course  ED Course as of 22 Strep testing negative, chest x-ray negative, patient with no abnormal finding of the throat on physical examination  No hypoxia or tachycardia  Stable for discharge, symptoms likely represent viral pharyngitis  MDM  Number of Diagnoses or Management Options     Amount and/or Complexity of Data Reviewed  Clinical lab tests: ordered and reviewed  Tests in the radiology section of CPT®: ordered and reviewed  Decide to obtain previous medical records or to obtain history from someone other than the patient: yes  Review and summarize past medical records: yes  Independent visualization of images, tracings, or specimens: yes        Disposition  Final diagnoses:   Viral URI with cough   Viral pharyngitis     Time reflects when diagnosis was documented in both MDM as applicable and the Disposition within this note     Time User Action Codes Description Comment    2022  7:16 PM Julio Richmond Add [J06 9] Viral URI with cough     2022  7:16 PM Otelia Apgar Add [J02 9] Viral pharyngitis       ED Disposition     ED Disposition Condition Date/Time Comment    Discharge Stable   7:16 PM [facility] discharge to home/self care              Follow-up Information     Follow up With Specialties Details Why Contact Info    Stacy Barboza MD Family Medicine   [address] [place] [address]  [phone]            Patient's Medications   Discharge Prescriptions    GUAIFENESIN (ROBITUSSIN) 100 MG/5ML ORAL LIQUID    Take 5-10 mL (100-200 mg total) by mouth every 4 (four) hours as needed for cough       Start Date: 2/28/2022 End Date: --       Order Dose: 100-200 mg       Quantity: 60 mL    Refills: 0       No discharge procedures on file      PDMP Review     None          ED Provider  Electronically Signed by           Blair Morin MD  02/28/22 3686

## 2022-06-10 ENCOUNTER — APPOINTMENT (EMERGENCY)
Dept: CT IMAGING | Facility: HOSPITAL | Age: 87
End: 2022-06-10
Payer: MEDICARE

## 2022-06-10 ENCOUNTER — APPOINTMENT (EMERGENCY)
Dept: RADIOLOGY | Facility: HOSPITAL | Age: 87
End: 2022-06-10
Payer: MEDICARE

## 2022-06-10 ENCOUNTER — HOSPITAL ENCOUNTER (EMERGENCY)
Facility: HOSPITAL | Age: 87
Discharge: HOME | End: 2022-06-10
Attending: EMERGENCY MEDICINE
Payer: MEDICARE

## 2022-06-10 VITALS
OXYGEN SATURATION: 96 % | HEIGHT: 62 IN | DIASTOLIC BLOOD PRESSURE: 74 MMHG | HEART RATE: 82 BPM | RESPIRATION RATE: 18 BRPM | TEMPERATURE: 97.7 F | SYSTOLIC BLOOD PRESSURE: 180 MMHG | WEIGHT: 132.94 LBS | BODY MASS INDEX: 24.46 KG/M2

## 2022-06-10 DIAGNOSIS — S82.891A CLOSED RIGHT ANKLE FRACTURE, INITIAL ENCOUNTER: ICD-10-CM
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
## 2022-06-10 PROCEDURE — 99284 EMERGENCY DEPT VISIT MOD MDM: CPT | Performed by: EMERGENCY MEDICINE

## 2022-06-10 PROCEDURE — 73610 X-RAY EXAM OF ANKLE: CPT

## 2022-06-10 PROCEDURE — 99284 EMERGENCY DEPT VISIT MOD MDM: CPT

## 2022-06-10 PROCEDURE — 73590 X-RAY EXAM OF LOWER LEG: CPT

## 2022-06-10 PROCEDURE — 70450 CT HEAD/BRAIN W/O DYE: CPT

## 2022-06-10 RX ORDER — IBUPROFEN 400 MG/1
400 TABLET ORAL ONCE
Status: COMPLETED | OUTPATIENT
Start: 2022-06-10 | End: 2022-06-10

## 2022-06-10 RX ADMIN — IBUPROFEN 400 MG: 400 TABLET, FILM COATED ORAL at 17:23

## 2022-06-10 NOTE — DISCHARGE INSTRUCTIONS
Use the splint and walker until seen in follow-up  You may apply ice to the area 4 to 6 times a day for 15 to 20 minutes at a time  You may remove the splint to do so  You may use Tylenol for pain  Try and leave the area elevated to reduce swelling  Return with any worsening  Follow up with Orthopedics

## 2022-06-10 NOTE — ED PROVIDER NOTES
History  Chief Complaint   Patient presents with    Ankle Injury     Pt reports she got up from the chair at 4pm and her right ankle was weak and gave out on her causing a fall  Pt denies LOC, denies hitting head, denies blood thinners  Very pleasant 63-year-old presents emergency room for evaluation of her right ankle after she suffered an unwitnessed fall and her backyard  Patient was found by her son to be sitting in her living room with an ice bag on her right ankle after she fell outside  Patient is unaware of how she fell  Patient had initially believe that the only place that she had injured was her right ankle  He had brought her to the emergency room for evaluation  Patient was also noticed to have a abrasion on the right side of her face in the bridge of her nose  She also has an ecchymotic area on the radial aspect of her left forearm  There is also an abrasion on her right elbow  There is some small ecchymosis on her right forearm  For her part, patient is only complaining of tenderness about the right ankle which is markedly swollen and ecchymotic  She has no chest pain or shortness of breath she does not know what caused her to fall  She has no unilateral weakness  She has not been recently ill  History provided by:  Patient and relative  Fall  Mechanism of injury: fall    Injury location:  Face, foot, shoulder/arm and leg  Facial injury location:  R cheek and nose  Shoulder/arm injury location:  R forearm, L wrist and L forearm  Leg injury location:  R lower leg  Foot injury location:  R ankle  Incident location:  Home  Fall:     Fall occurred:  Standing    Impact surface:  Unable to specify    Point of impact:  Unable to specify  Suspicion of alcohol use: no    Prior to arrival data:      Airway condition since incident:  Stable    Breathing condition since incident:  Stable    Circulation condition since incident:  Stable    Mental status condition since incident:  Stable Disability condition since incident:  Stable  Associated symptoms: no abdominal pain, no back pain, no chest pain, no difficulty breathing and no headaches    Risk factors: no anticoagulation therapy        Prior to Admission Medications   Prescriptions Last Dose Informant Patient Reported? Taking?   acetaminophen (TYLENOL) 325 mg tablet   No No   Sig: Take 2 tablets (650 mg total) by mouth every 6 (six) hours as needed for mild pain or fever   calcium citrate-vitamin D (CITRACAL+D) 315-200 MG-UNIT per tablet  Self Yes No   Sig: Take 2 tablets by mouth daily   guaiFENesin (ROBITUSSIN) 100 MG/5ML oral liquid   No No   Sig: Take 5-10 mL (100-200 mg total) by mouth every 4 (four) hours as needed for cough   latanoprost (XALATAN) 0 005 % ophthalmic solution  Self Yes No   Sig: Administer 1 drop to both eyes daily at bedtime   levothyroxine 75 mcg tablet  Self Yes No   Sig: Take 75 mcg by mouth daily   multivitamin-iron-minerals-folic acid (CENTRUM) chewable tablet  Self Yes No   Sig: Chew 1 tablet daily   simvastatin (ZOCOR) 40 mg tablet  Self Yes No   Sig: Take 40 mg by mouth daily at bedtime      Facility-Administered Medications: None       Past Medical History:   Diagnosis Date    Disease of thyroid gland     Hypercholesterolemia        Past Surgical History:   Procedure Laterality Date    EYE SURGERY Bilateral     TONSILLECTOMY         Family History   Problem Relation Age of Onset    Hypertension Father      I have reviewed and agree with the history as documented  E-Cigarette/Vaping    E-Cigarette Use Never User      E-Cigarette/Vaping Substances     Social History     Tobacco Use    Smoking status: Never Smoker    Smokeless tobacco: Never Used   Vaping Use    Vaping Use: Never used   Substance Use Topics    Alcohol use: Not Currently    Drug use: Never       Review of Systems   Constitutional: Negative  HENT: Negative  Respiratory: Negative  Cardiovascular: Negative    Negative for chest pain    Gastrointestinal: Negative for abdominal pain  Musculoskeletal: Positive for arthralgias and gait problem  Negative for back pain  Neurological: Negative for dizziness, syncope, weakness, light-headedness, numbness and headaches  All other systems reviewed and are negative  Physical Exam  Physical Exam  Vitals and nursing note reviewed  Constitutional:       General: She is awake  She is not in acute distress  Appearance: Normal appearance  She is well-developed  She is not toxic-appearing  HENT:      Head: Normocephalic  Abrasion and contusion present  Hair is normal       Jaw: No pain on movement  Right Ear: External ear normal       Left Ear: External ear normal       Nose: Nose normal    Eyes:      General: Lids are normal  No scleral icterus  Extraocular Movements: Extraocular movements intact  Pupils: Pupils are equal, round, and reactive to light  Cardiovascular:      Rate and Rhythm: Normal rate and regular rhythm  Heart sounds: Murmur heard  Pulmonary:      Effort: Pulmonary effort is normal  No respiratory distress  Breath sounds: Normal breath sounds  No stridor  No wheezing or rales  Abdominal:      General: Abdomen is flat  There is no distension  Palpations: Abdomen is soft  Tenderness: There is no abdominal tenderness  There is no guarding  Musculoskeletal:         General: No deformity  Normal range of motion  Right upper arm: No tenderness  Left upper arm: No tenderness  Arms:       Cervical back: Normal range of motion and neck supple  Right lower leg: Tenderness present  Right ankle: Swelling and ecchymosis present  No deformity  Tenderness present over the ATF ligament  Normal range of motion  Left ankle: Normal       Right foot: Normal       Left foot: Normal    Skin:     General: Skin is warm and dry  Coloration: Skin is not jaundiced or pale  Findings: Ecchymosis present  No rash  Neurological:      Mental Status: She is alert and oriented to person, place, and time  Mental status is at baseline  Cranial Nerves: No cranial nerve deficit  Psychiatric:         Attention and Perception: Attention normal          Mood and Affect: Mood normal          Speech: Speech normal          Behavior: Behavior normal          Vital Signs  ED Triage Vitals   Temperature Pulse Respirations Blood Pressure SpO2   06/10/22 1713 06/10/22 1713 06/10/22 1713 06/10/22 1713 06/10/22 1713   97 7 °F (36 5 °C) 84 18 (!) 203/93 97 %      Temp src Heart Rate Source Patient Position - Orthostatic VS BP Location FiO2 (%)   -- 06/10/22 1715 06/10/22 1715 06/10/22 1715 --    Monitor Lying Right arm       Pain Score       06/10/22 1713       4           Vitals:    06/10/22 1713 06/10/22 1715 06/10/22 1730 06/10/22 1800   BP: (!) 203/93 (!) 181/83 (!) 188/63 164/74   Pulse: 84 81 88 78   Patient Position - Orthostatic VS:  Lying Lying Lying         Visual Acuity      ED Medications  Medications   ibuprofen (MOTRIN) tablet 400 mg (400 mg Oral Given 6/10/22 1723)       Diagnostic Studies  Results Reviewed     None                 CT head without contrast   Final Result by Irma Simmons MD (06/10 1847)      No acute intracranial abnormality  Workstation performed: [id number]         XR tibia fibula 2 views RIGHT   Final Result by Zen Woods MD (06/10 1814)      1  Possible cortical irregularity of the lateral malleolus on the oblique ankle view  Cannot exclude nondisplaced fracture  2   Ankle effusion  3   Soft tissue swelling over the lateral malleolus and anterior to the ankle  The study was marked in [facility] for immediate notification  Workstation performed: [id number]         XR ankle 3+ views RIGHT   Final Result by Zen Woods MD (06/10 1814)      1  Possible cortical irregularity of the lateral malleolus on the oblique ankle view    Cannot exclude nondisplaced fracture  2   Ankle effusion  3   Soft tissue swelling over the lateral malleolus and anterior to the ankle  The study was marked in [facility] for immediate notification  Workstation performed: [id number]                    Procedures  Procedures         ED Course  ED Course as of 06/10/22 1851   Fri Soto 10, 2022   1818 Possible lateral malleolus fracture   1850 Patient was placed in a cam boot given a walker  Patient did well with both  Patient lives with son  She has supported home  Patient will follow-up with orthopedics  CT the head was negative  Patient stable for discharge  SBIRT 20yo+    Flowsheet Row Most Recent Value   SBIRT (23 yo +)    In order to provide better care to our patients, we are screening all of our patients for alcohol and drug use  Would it be okay to ask you these screening questions? Yes Filed at: 06/10/2022 1721   Initial Alcohol Screen: US AUDIT-C     1  How often do you have a drink containing alcohol? 0 Filed at: 06/10/2022 1721   2  How many drinks containing alcohol do you have on a typical day you are drinking? 0 Filed at: 06/10/2022 1721   3b  FEMALE Any Age, or MALE 65+: How often do you have 4 or more drinks on one occassion? 0 Filed at: 06/10/2022 1721   Audit-C Score 0 Filed at: 06/10/2022 1721   [place]: How many times in the past year have you    Used an illegal drug or used a prescription medication for non-medical reasons?  Never Filed at: 06/10/2022 1721                    MDM  Number of Diagnoses or Management Options  Closed right ankle fracture, initial encounter: new and requires workup  Fall, initial encounter: new and requires workup     Amount and/or Complexity of Data Reviewed  Tests in the radiology section of CPT®: ordered and reviewed  Discussion of test results with the performing providers: yes  Independent visualization of images, tracings, or specimens: yes    Risk of Complications, Morbidity, and/or Mortality  Presenting problems: moderate  Diagnostic procedures: moderate  Management options: moderate        Disposition  Final diagnoses:   Fall, initial encounter   Closed right ankle fracture, initial encounter     Time reflects when diagnosis was documented in both MDM as applicable and the Disposition within this note     Time User Action Codes Description Comment    6/10/2022  6:23 PM Ligia Cox  [facility]] Fall, initial encounter     6/10/2022  6:23 PM Ligia Ramos [[id number]] Closed right ankle fracture, initial encounter       ED Disposition     ED Disposition   Discharge    Condition   Stable    Date/Time   Fri Soto 10, 2022  6:38 PM    Comment   Kj Claros discharge to home/self care                 Follow-up Information     Follow up With Specialties Details Why Contact Info    Ale Frye MD Family Medicine  As needed Keyshawn Goode [address] [place] [address]      [facility] In 3 days Even in well [address]  [address] [place] [address],[id number] [phone]            Patient's Medications   Discharge Prescriptions    No medications on file           PDMP Review     None          ED Provider  Electronically Signed by           Violetta Martinez DO  06/10/22 4124

## 2022-06-13 ENCOUNTER — OFFICE VISIT (OUTPATIENT)
Dept: OBGYN CLINIC | Facility: CLINIC | Age: 87
End: 2022-06-13
Payer: MEDICARE

## 2022-06-13 VITALS
SYSTOLIC BLOOD PRESSURE: 132 MMHG | HEIGHT: 62 IN | DIASTOLIC BLOOD PRESSURE: 74 MMHG | TEMPERATURE: 98.3 F | WEIGHT: 132 LBS | BODY MASS INDEX: 24.29 KG/M2 | HEART RATE: 73 BPM

## 2022-06-13 DIAGNOSIS — S50.12XA CONTUSION OF LEFT FOREARM, INITIAL ENCOUNTER: ICD-10-CM

## 2022-06-13 DIAGNOSIS — S82.831A OTHER CLOSED FRACTURE OF DISTAL END OF RIGHT FIBULA, INITIAL ENCOUNTER: ICD-10-CM

## 2022-06-13 DIAGNOSIS — M25.571 ACUTE RIGHT ANKLE PAIN: Primary | ICD-10-CM

## 2022-06-13 PROCEDURE — 99204 OFFICE O/P NEW MOD 45 MIN: CPT | Performed by: ORTHOPAEDIC SURGERY

## 2022-06-13 NOTE — PROGRESS NOTES
ASSESSMENT/PLAN:    Diagnoses and all orders for this visit:    Acute right ankle pain    Other closed fracture of distal end of right fibula, initial encounter    Contusion of left forearm, initial encounter        Plan:  I would recommend continued use of the cast boot  I would recommend that this be maintained at all times, not removed for sleeping as she has previously been doing  I discussed the potential benefit of a cast   However, she agrees to be compliant with full-time use of the cast boot  I will see her in 1 week for re-evaluation with AP, lateral and mortise images of her right ankle obtained at follow-up  The boot may be removed for x-rays  The office should be contacted if questions or concerns arise  Precautions have been reviewed, questions answered and instructions provided  Return in about 1 week (around 6/20/2022)  _____________________________________________________  CHIEF COMPLAINT:  Chief Complaint   Patient presents with    Right Ankle - Fracture, Pain         SUBJECTIVE:  Virginie Allison is a 80y o  year old female who presents for evaluation of her right ankle  She fell injuring her right ankle on 06/10/2022  She states she stood up quickly to get her son, fell injuring her right ankle  She is unsure of the exact reason for her fall  She denies lightheadedness or dizziness, chest pain or shortness of breath and denies that she stumbled or fell on anything  She was able to ambulate afterwards and walked herself to the car EN route to the emergency room and walk from the car into the emergency room  X-rays were obtained, she was placed into a cast boot and now presents for orthopedic evaluation and treatment  She has been removing the boot for sleeping at night and will get up to go to the bathroom, ambulating about 10 steps to the bathroom from her bed without the boot in place  Her son will remove the boot for icing twice daily  She complains of lateral pain    In addition to the ankle injury, she has ecchymosis on the left forearm which she believes was from the fall  In addition, she does have some ecchymosis on her face and was evaluated in the emergency room for this        PAST MEDICAL HISTORY:  Past Medical History:   Diagnosis Date    Disease of thyroid gland     Hypercholesterolemia        PAST SURGICAL HISTORY:  Past Surgical History:   Procedure Laterality Date    EYE SURGERY Bilateral     TONSILLECTOMY         FAMILY HISTORY:  Family History   Problem Relation Age of Onset    Hypertension Father        SOCIAL HISTORY:  Social History     Tobacco Use    Smoking status: Never Smoker    Smokeless tobacco: Never Used   Vaping Use    Vaping Use: Never used   Substance Use Topics    Alcohol use: Not Currently    Drug use: Never       MEDICATIONS:    Current Outpatient Medications:     acetaminophen (TYLENOL) 325 mg tablet, Take 2 tablets (650 mg total) by mouth every 6 (six) hours as needed for mild pain or fever, Disp: 30 tablet, Rfl: 0    calcium citrate-vitamin D (CITRACAL+D) 315-200 MG-UNIT per tablet, Take 2 tablets by mouth daily, Disp: , Rfl:     latanoprost (XALATAN) 0 005 % ophthalmic solution, Administer 1 drop to both eyes daily at bedtime, Disp: , Rfl:     levothyroxine 75 mcg tablet, Take 75 mcg by mouth daily, Disp: , Rfl:     multivitamin-iron-minerals-folic acid (CENTRUM) chewable tablet, Chew 1 tablet daily, Disp: , Rfl:     simvastatin (ZOCOR) 40 mg tablet, Take 40 mg by mouth daily at bedtime, Disp: , Rfl:     guaiFENesin (ROBITUSSIN) 100 MG/5ML oral liquid, Take 5-10 mL (100-200 mg total) by mouth every 4 (four) hours as needed for cough (Patient not taking: Reported on 6/13/2022), Disp: 60 mL, Rfl: 0    ALLERGIES:  Allergies   Allergen Reactions    Aspirin Other (See Comments)     nausea vomiting    Bee Venom Other (See Comments)     Passed out   Rash        Review of systems:   Constitutional: Negative for fatigue, fever or loss of apetite  HENT: Negative  Respiratory: Negative for shortness of breath, dyspnea  Cardiovascular: Negative for chest pain/tightness  Gastrointestinal: Negative for abdominal pain, N/V  Endocrine: Negative for cold/heat intolerance, unexplained weight loss/gain  Genitourinary: Negative for flank pain, dysuria, hematuria  Musculoskeletal:  Positive as in the HPI   Skin: Negative for rash  Neurological:  Negative  Psychiatric/Behavioral: Negative for agitation  _____________________________________________________  PHYSICAL EXAMINATION:    Blood pressure 132/74, pulse 73, temperature 98 3 °F (36 8 °C), temperature source Temporal, height 5' 2" (1 575 m), weight 59 9 kg (132 lb)  General: well developed and well nourished, alert, oriented times 3 and appears comfortable  Psychiatric: Normal  HEENT: Benign  Cardiovascular: Regular    Pulmonary: No wheezing or stridor  Abdomen: Soft, Nontender  Skin: No masses, erythema, lacerations, fluctation, ulcerations  Neurovascular: Motor and sensory exams are grossly intact and pulses palpable  Good color and capillary refill is noted  MUSCULOSKELETAL EXAMINATION:    The right lower extremity exam demonstrates good range of motion of the hip and knee without complaints  The walker cast boot is in place  This was removed without difficulty  She demonstrates minimal swelling but significant ecchymosis noted from the ankle distally into her foot  She has tenderness over the distal fibula  The distal tibia is nontender  Deltoid ligament and the lateral ankle ligaments are nontender  The bony and soft tissue structures about the foot are nontender  Calf compartments are soft and nontender  The left lower extremity exam is benign  The bilateral upper extremity examination is also benign although some resolving ecchymosis is noted in the left forearm        _____________________________________________________  STUDIES REVIEWED:  X-rays of the ankle and tibia/fibula were reviewed and demonstrated a nondisplaced distal fibular fracture visible only on 1 of the images  The report was reviewed  The ER note was reviewed  PROCEDURES:  Fracture / Dislocation Treatment    Date/Time: 6/13/2022 3:49 PM  Performed by: Cuauhtemoc Yao  Authorized by: Cuauhtemoc Yao     Patient Location:  [facility] Protocol:  Consent: Verbal consent obtained  Written consent not obtained  Risks and benefits: risks, benefits and alternatives were discussed  Consent given by: patient  Patient understanding: patient states understanding of the procedure being performed  Test results: test results available and properly labeled  Radiology Images displayed and confirmed   If images not available, report reviewed: imaging studies available      Injury location:  Ankle  Neurovascular status: Neurovascularly intact    Local anesthesia used?: No    Immobilization:  Brace and other (comment) (Cast boot)  Neurovascular status: Neurovascularly intact    Patient tolerance:  Patient tolerated the procedure well with no immediate complications          Cuauhtemoc Yao

## 2022-06-20 ENCOUNTER — OFFICE VISIT (OUTPATIENT)
Dept: OBGYN CLINIC | Facility: CLINIC | Age: 87
End: 2022-06-20
Payer: MEDICARE

## 2022-06-20 ENCOUNTER — HOSPITAL ENCOUNTER (OUTPATIENT)
Dept: RADIOLOGY | Facility: CLINIC | Age: 87
Discharge: HOME | End: 2022-06-20
Payer: MEDICARE

## 2022-06-20 VITALS
SYSTOLIC BLOOD PRESSURE: 128 MMHG | HEIGHT: 62 IN | TEMPERATURE: 97.8 F | WEIGHT: 132 LBS | BODY MASS INDEX: 24.29 KG/M2 | DIASTOLIC BLOOD PRESSURE: 82 MMHG | HEART RATE: 73 BPM

## 2022-06-20 DIAGNOSIS — S82.831D OTHER CLOSED FRACTURE OF DISTAL END OF RIGHT FIBULA WITH ROUTINE HEALING, SUBSEQUENT ENCOUNTER: Primary | ICD-10-CM

## 2022-06-20 DIAGNOSIS — S82.831D OTHER CLOSED FRACTURE OF DISTAL END OF RIGHT FIBULA WITH ROUTINE HEALING, SUBSEQUENT ENCOUNTER: ICD-10-CM

## 2022-06-20 DIAGNOSIS — S82.891A CLOSED RIGHT ANKLE FRACTURE, INITIAL ENCOUNTER: ICD-10-CM

## 2022-06-20 PROCEDURE — 99213 OFFICE O/P EST LOW 20 MIN: CPT | Performed by: ORTHOPAEDIC SURGERY

## 2022-06-20 PROCEDURE — 73610 X-RAY EXAM OF ANKLE: CPT

## 2022-06-20 NOTE — PROGRESS NOTES
ASSESSMENT/PLAN:    Diagnoses and all orders for this visit:    Other closed fracture of distal end of right fibula with routine healing, subsequent encounter  -     XR ankle 3+ vw right; Future    Closed right ankle fracture, initial encounter  -     Ambulatory Referral to Orthopedic Surgery      Plan:  I would recommend follow-up in 3 weeks  X-rays of the ankle will be obtained at follow-up  The walker cast boot is to be utilized but may be removed for cleansing with precautions as discussed if she chooses to shower while sitting in a shower stall  I have encouraged her to contact the office if questions or concerns arise   _____________________________________________________  CHIEF COMPLAINT:  Chief Complaint   Patient presents with    Follow-up         SUBJECTIVE:  Cory Ascencio is a 80y o  year old female who presents for follow up of right lateral ankle injury  sustained 6/10/2022  On today's presentation, she states that she has been consistent with use of the provided a Cam boot as previously instructed  She states that she has little to no pain at rest or with weight-bearing activity  She reports mild soreness at the end of the day for which she takes 2 Tylenol and is able to sleep comfortably through the night  She reports mild continued swelling as well as bruising along the anterior and lateral portions of the ankle and foot  she denies any numbness or tingling      PAST MEDICAL HISTORY:  Past Medical History:   Diagnosis Date    Disease of thyroid gland     Hypercholesterolemia        PAST SURGICAL HISTORY:  Past Surgical History:   Procedure Laterality Date    EYE SURGERY Bilateral     TONSILLECTOMY         FAMILY HISTORY:  Family History   Problem Relation Age of Onset    Hypertension Father        SOCIAL HISTORY:  Social History     Tobacco Use    Smoking status: Never Smoker    Smokeless tobacco: Never Used   Vaping Use    Vaping Use: Never used   Substance Use Topics    Alcohol use: Not Currently    Drug use: Never       MEDICATIONS:    Current Outpatient Medications:     acetaminophen (TYLENOL) 325 mg tablet, Take 2 tablets (650 mg total) by mouth every 6 (six) hours as needed for mild pain or fever, Disp: 30 tablet, Rfl: 0    calcium citrate-vitamin D (CITRACAL+D) 315-200 MG-UNIT per tablet, Take 2 tablets by mouth daily, Disp: , Rfl:     latanoprost (XALATAN) 0 005 % ophthalmic solution, Administer 1 drop to both eyes daily at bedtime, Disp: , Rfl:     levothyroxine 75 mcg tablet, Take 75 mcg by mouth daily, Disp: , Rfl:     multivitamin-iron-minerals-folic acid (CENTRUM) chewable tablet, Chew 1 tablet daily, Disp: , Rfl:     simvastatin (ZOCOR) 40 mg tablet, Take 40 mg by mouth daily at bedtime, Disp: , Rfl:     guaiFENesin (ROBITUSSIN) 100 MG/5ML oral liquid, Take 5-10 mL (100-200 mg total) by mouth every 4 (four) hours as needed for cough (Patient not taking: No sig reported), Disp: 60 mL, Rfl: 0    ALLERGIES:  Allergies   Allergen Reactions    Aspirin Other (See Comments)     nausea vomiting    Bee Venom Other (See Comments)     Passed out   Rash        REVIEW OF SYSTEMS:  Pertinent items are noted in HPI    A comprehensive review of systems was negative       _____________________________________________________  PHYSICAL EXAMINATION:  General: well developed and well nourished, alert, oriented times 3 and appears comfortable  Psychiatric: Normal  HEENT:  Benign  Cardiovascular:  Normal rate  Pulmonary: No wheezing or stridor  Skin: No masses, erythema, lacerations, fluctation, ulcerations  Neurovascular:  Palpable DP pulse    MUSCULOSKELETAL EXAMINATION:  Right ankle -   Patient ambulates with steady gait pattern - with CAM walker boot in place  Skin is warm and dry to touch with no signs of erythema or infection  Mild general soft tissue swelling with associated ecchymosis, no effusion noted  ROM demonstrates stiffness with active dorsiflexion, plantar flexion 0°-30°  Mildly TTP over lateral malleolus, nontender over medial malleolus  MMT deferred  - anterior drawer  - medial talar tilt, - lateral talar tilt  - syndesmotic squeeze  Calf compartments are soft and supple  - Anthony's sign  2+ TP and DP pulses with brisk capillary refill to the toes  Sural, saphenous, tibial, superficial and deep peroneal motor and sensory distributions intact  Sensation light touch intact distally    _____________________________________________________  STUDIES REVIEWED:  Attending Physician has personally reviewed pertinent imaging in PACS, impression is as follows:    Review of radiographic series taken 6/20/2022 of the right ankle shows no evidence of fracture      PROCEDURES PERFORMED:  Under procedure performed this visit        Scribe Attestation    I,:  Marlo Wall am acting as a scribe while in the presence of the attending physician :       I,:  Amari Montalvo personally performed the services described in this documentation    as scribed in my presence :

## 2022-07-11 ENCOUNTER — OFFICE VISIT (OUTPATIENT)
Dept: OBGYN CLINIC | Facility: CLINIC | Age: 87
End: 2022-07-11
Payer: MEDICARE

## 2022-07-11 ENCOUNTER — HOSPITAL ENCOUNTER (OUTPATIENT)
Dept: RADIOLOGY | Facility: CLINIC | Age: 87
Discharge: HOME | End: 2022-07-11
Payer: MEDICARE

## 2022-07-11 VITALS
HEIGHT: 62 IN | TEMPERATURE: 97.8 F | WEIGHT: 132 LBS | BODY MASS INDEX: 24.29 KG/M2 | DIASTOLIC BLOOD PRESSURE: 80 MMHG | HEART RATE: 81 BPM | SYSTOLIC BLOOD PRESSURE: 140 MMHG

## 2022-07-11 DIAGNOSIS — S82.831D OTHER CLOSED FRACTURE OF DISTAL END OF RIGHT FIBULA WITH ROUTINE HEALING, SUBSEQUENT ENCOUNTER: ICD-10-CM

## 2022-07-11 DIAGNOSIS — S82.831D OTHER CLOSED FRACTURE OF DISTAL END OF RIGHT FIBULA WITH ROUTINE HEALING, SUBSEQUENT ENCOUNTER: Primary | ICD-10-CM

## 2022-07-11 DIAGNOSIS — M25.571 ACUTE RIGHT ANKLE PAIN: ICD-10-CM

## 2022-07-11 PROCEDURE — 73610 X-RAY EXAM OF ANKLE: CPT

## 2022-07-11 PROCEDURE — 99213 OFFICE O/P EST LOW 20 MIN: CPT | Performed by: ORTHOPAEDIC SURGERY

## 2022-07-11 NOTE — PROGRESS NOTES
ASSESSMENT/PLAN:    Problem List Items Addressed This Visit        Musculoskeletal and Integument    Closed fracture of right distal fibula - Primary    Relevant Orders    XR ankle 3+ vw right    Ankle Cude ankle/Ankle Brace       Other    Acute right ankle pain          X-rays taken today in office were reviewed and discussed with the patient, which shows a well healing fracture in unchanged alignment  Today the patient was transitioned from her cam boot to an ankle brace  The patient was instructed to wear the brace at all times with activity, removing for rest, hygiene, and gentle ROM exercises  The patient will return to the office in 3 weeks for recheck and repeat right ankle x-rays, three view  The patient and her son were instructed to call or return to the office sooner if any problems arise  Return in about 3 weeks (around 8/1/2022) for Recheck       _____________________________________________________  CHIEF COMPLAINT:  Chief Complaint   Patient presents with    Follow-up         SUBJECTIVE:  Suzy Barajas is a 80 y o  female who presents for follow up of her right distal fibula fracture sustained on 6/10/2022  The patient is now 4 weeks post injury  Today the patient states that she is doing very well  She denies any pain, swelling, numbness, or tingling in the lower extremity  The patient has been wearing her cam boot at all times as instructed, removing for hygiene only  The patient denies any pain in the ankle with weight bearing  Neither the patient or her son have any questions or concerns today           PAST MEDICAL HISTORY:  Past Medical History:   Diagnosis Date    Disease of thyroid gland     Hypercholesterolemia        PAST SURGICAL HISTORY:  Past Surgical History:   Procedure Laterality Date    EYE SURGERY Bilateral     TONSILLECTOMY         FAMILY HISTORY:  Family History   Problem Relation Age of Onset    Hypertension Father        SOCIAL HISTORY:  Social History     Tobacco Use  Smoking status: Never Smoker    Smokeless tobacco: Never Used   Vaping Use    Vaping Use: Never used   Substance Use Topics    Alcohol use: Not Currently    Drug use: Never       MEDICATIONS:    Current Outpatient Medications:     acetaminophen (TYLENOL) 325 mg tablet, Take 2 tablets (650 mg total) by mouth every 6 (six) hours as needed for mild pain or fever, Disp: 30 tablet, Rfl: 0    calcium citrate-vitamin D (CITRACAL+D) 315-200 MG-UNIT per tablet, Take 2 tablets by mouth daily, Disp: , Rfl:     latanoprost (XALATAN) 0 005 % ophthalmic solution, Administer 1 drop to both eyes daily at bedtime, Disp: , Rfl:     levothyroxine 75 mcg tablet, Take 75 mcg by mouth daily, Disp: , Rfl:     multivitamin-iron-minerals-folic acid (CENTRUM) chewable tablet, Chew 1 tablet daily, Disp: , Rfl:     simvastatin (ZOCOR) 40 mg tablet, Take 40 mg by mouth daily at bedtime, Disp: , Rfl:     guaiFENesin (ROBITUSSIN) 100 MG/5ML oral liquid, Take 5-10 mL (100-200 mg total) by mouth every 4 (four) hours as needed for cough (Patient not taking: No sig reported), Disp: 60 mL, Rfl: 0    ALLERGIES:  Allergies   Allergen Reactions    Aspirin Other (See Comments)     nausea vomiting    Bee Venom Other (See Comments)     Passed out   Rash        REVIEW OF SYSTEMS:  Pertinent items are noted in HPI    A comprehensive review of systems was negative       _____________________________________________________  PHYSICAL EXAMINATION:  General: well developed and well nourished, alert, oriented times 3 and appears comfortable  Psychiatric: Normal  HEENT:  Normocephalic, atraumatic  Cardiovascular:  Regular  Pulmonary: No wheezing or stridor  Skin: No masses, erthema, lacerations, fluctation, ulcerations  Neurovascular: strong distal pulses, sensory and motor testing intact     MUSCULOSKELETAL EXAMINATION:    Right ankle:  - cam boot removed for examination without difficulty  - skin without lesions, ecchymosis, erythema, swelling, warmth, or other signs of infection  - there is mild tenderness with palpation along the distal fibula  - full active ankle ROM without complaint, including eversion and inversion  - intact ROM of all digits  - ankle stable to varus and valgus stress  - 5/5 strength resisted ankle dorsiflexion/plantarflexion  - Sensation intact to light touch along the DP/SP/SA/SIDDIQUI/T nerve distributions  - negative anterior drawer  - negative compression test  - strong pedal pulse  - brisk cap refill in all toes    _____________________________________________________  STUDIES REVIEWED:  I have personally reviewed pertinent films and reports in PACS and my interpretation is as follows:     X-rays of the right ankle taken today in office demonstrate a well-healing nondisplaced distal fibular fracture, visualized on ankle mortise view as compared to initial imaging taken on 6/10/2022  Fracture line visible on lateral view on images taken on 6/20/2022 no longer visible on imaging today  PROCEDURES PERFORMED:   Procedures  None performed today            Tanisha Weiner PA-C

## 2022-08-05 ENCOUNTER — OFFICE VISIT (OUTPATIENT)
Dept: OBGYN CLINIC | Facility: CLINIC | Age: 87
End: 2022-08-05
Payer: MEDICARE

## 2022-08-05 ENCOUNTER — HOSPITAL ENCOUNTER (OUTPATIENT)
Dept: RADIOLOGY | Facility: CLINIC | Age: 87
Discharge: HOME | End: 2022-08-05
Payer: MEDICARE

## 2022-08-05 VITALS
HEART RATE: 71 BPM | SYSTOLIC BLOOD PRESSURE: 126 MMHG | WEIGHT: 132 LBS | HEIGHT: 62 IN | TEMPERATURE: 97.9 F | DIASTOLIC BLOOD PRESSURE: 74 MMHG | BODY MASS INDEX: 24.29 KG/M2

## 2022-08-05 DIAGNOSIS — S82.831D OTHER CLOSED FRACTURE OF DISTAL END OF RIGHT FIBULA WITH ROUTINE HEALING, SUBSEQUENT ENCOUNTER: Primary | ICD-10-CM

## 2022-08-05 DIAGNOSIS — S82.831D OTHER CLOSED FRACTURE OF DISTAL END OF RIGHT FIBULA WITH ROUTINE HEALING, SUBSEQUENT ENCOUNTER: ICD-10-CM

## 2022-08-05 PROCEDURE — 73610 X-RAY EXAM OF ANKLE: CPT

## 2022-08-05 PROCEDURE — 99213 OFFICE O/P EST LOW 20 MIN: CPT | Performed by: ORTHOPAEDIC SURGERY

## 2022-08-05 NOTE — PROGRESS NOTES
ASSESSMENT/PLAN:    Problem List Items Addressed This Visit        Musculoskeletal and Integument    Closed fracture of right distal fibula - Primary    Relevant Orders    XR ankle 3+ vw right          The patient is now 8 weeks post initial injury  X-rays taken today in office demonstrate a well healed fracture in anatomic alignment  The patient presented today with full ankle ROM and strength, without palpable tenderness on exam  The patient was advised that she may wean from the brace as tolerated  She may resume her normal activities without restriction  The patient was instructed to return as needed if any problems arise  Return if symptoms worsen or fail to improve       _____________________________________________________  CHIEF COMPLAINT:  Chief Complaint   Patient presents with    fracture care         SUBJECTIVE:  Deangelo Brunner is a 80 y o  female who presents for follow up of her right distal fibula fracture sustained on 6/10/2022  The patient is now 8 weeks post initial injury  At the patient's last office exam on 7/11/2022 the patient was transitioned from her cam boot to an ankle brace  Today the patient states that she is doing very well  She denies any pain, numbness, or tingling  The patient does note that by the end of the day she does have some swelling in the ankle  The patient states that she has been wearing her brace as instructed  She states that she is ambulating well on her own without difficulty or pain  Neither the patient or her son have any questions or concerns today       PAST MEDICAL HISTORY:  Past Medical History:   Diagnosis Date    Disease of thyroid gland     Hypercholesterolemia        PAST SURGICAL HISTORY:  Past Surgical History:   Procedure Laterality Date    EYE SURGERY Bilateral     TONSILLECTOMY         FAMILY HISTORY:  Family History   Problem Relation Age of Onset    Hypertension Father        SOCIAL HISTORY:  Social History     Tobacco Use    Smoking status: Never Smoker    Smokeless tobacco: Never Used   Vaping Use    Vaping Use: Never used   Substance Use Topics    Alcohol use: Not Currently    Drug use: Never       MEDICATIONS:    Current Outpatient Medications:     acetaminophen (TYLENOL) 325 mg tablet, Take 2 tablets (650 mg total) by mouth every 6 (six) hours as needed for mild pain or fever, Disp: 30 tablet, Rfl: 0    calcium citrate-vitamin D (CITRACAL+D) 315-200 MG-UNIT per tablet, Take 2 tablets by mouth daily, Disp: , Rfl:     latanoprost (XALATAN) 0 005 % ophthalmic solution, Administer 1 drop to both eyes daily at bedtime, Disp: , Rfl:     levothyroxine 75 mcg tablet, Take 75 mcg by mouth daily, Disp: , Rfl:     multivitamin-iron-minerals-folic acid (CENTRUM) chewable tablet, Chew 1 tablet daily, Disp: , Rfl:     simvastatin (ZOCOR) 40 mg tablet, Take 40 mg by mouth daily at bedtime, Disp: , Rfl:     guaiFENesin (ROBITUSSIN) 100 MG/5ML oral liquid, Take 5-10 mL (100-200 mg total) by mouth every 4 (four) hours as needed for cough (Patient not taking: No sig reported), Disp: 60 mL, Rfl: 0    ALLERGIES:  Allergies   Allergen Reactions    Aspirin Other (See Comments)     nausea vomiting    Bee Venom Other (See Comments)     Passed out   Rash        REVIEW OF SYSTEMS:  Pertinent items are noted in HPI  A comprehensive review of systems was negative       _____________________________________________________  PHYSICAL EXAMINATION:  General: well developed and well nourished, alert, oriented times 3 and appears comfortable  Psychiatric: Normal  HEENT:  Normocephalic, atraumatic  Cardiovascular:  Regular  Pulmonary: No wheezing or stridor  Skin: No masses, erthema, lacerations, fluctation, ulcerations  Neurovascular: strong distal pulses, sensory and motor testing intact     MUSCULOSKELETAL EXAMINATION:    Right ankle  - the patient is able to stand and ambulate well on her own, no antalgic gait   She presents with sneakers, her ankle brace, and socks, which were removed for examination without difficulty  - skin without lesions, ecchymosis, erythema, swelling, warmth, or other signs of infection  - there is no palpable tenderness along the distal fibula or the ankle  - full active ROM of the ankle including eversion and inversion without pain  - 5/5 strength resisted ankle dorsiflexion/plantarflexion  - good subtalar and forefoot motion without complaint  - full active ROM of the knee  - soft lower extremity compartments  - sensation intact to light touch along the DP/SP/SA/SIDDIQUI/T nerve distributions  - strong pedal pulse  - brisk cap refill in all toes  _____________________________________________________  STUDIES REVIEWED:  I have personally reviewed pertinent films and reports in PACS and my interpretation is as follows:     X-rays of the right ankle taken today in office demonstrate a well healed fracture, no signs of fracture line visible, and in unchanged anatomic alignment compared to previous imaging  PROCEDURES PERFORMED:   Procedures  None performed today            Mi Alvarado PA-C

## 2022-09-14 ENCOUNTER — HOSPITAL ENCOUNTER (INPATIENT)
Facility: HOSPITAL | Age: 87
LOS: 1 days | Discharge: HOME | DRG: 343 | End: 2022-09-15
Attending: EMERGENCY MEDICINE | Admitting: SURGERY
Payer: MEDICARE

## 2022-09-14 ENCOUNTER — ANESTHESIA EVENT (INPATIENT)
Dept: PERIOP | Facility: HOSPITAL | Age: 87
DRG: 343 | End: 2022-09-14
Payer: MEDICARE

## 2022-09-14 ENCOUNTER — APPOINTMENT (EMERGENCY)
Dept: CT IMAGING | Facility: HOSPITAL | Age: 87
DRG: 343 | End: 2022-09-14
Payer: MEDICARE

## 2022-09-14 ENCOUNTER — ANESTHESIA (INPATIENT)
Dept: PERIOP | Facility: HOSPITAL | Age: 87
DRG: 343 | End: 2022-09-14
Payer: MEDICARE

## 2022-09-14 DIAGNOSIS — K35.80 ACUTE APPENDICITIS: Primary | ICD-10-CM

## 2022-09-14 LAB
ALBUMIN SERPL BCP-MCNC: 3.8 G/DL (ref 3.5–5)
ALP SERPL-CCNC: 66 U/L (ref 46–116)
ALT SERPL W P-5'-P-CCNC: 30 U/L (ref 12–78)
ANION GAP SERPL CALCULATED.3IONS-SCNC: 9 MMOL/L (ref 4–13)
AST SERPL W P-5'-P-CCNC: 39 U/L (ref 5–45)
BACTERIA UR QL AUTO: ABNORMAL /HPF
BASOPHILS # BLD AUTO: 0.04 THOUSANDS/ÂΜL (ref 0–0.1)
BASOPHILS NFR BLD AUTO: 0 % (ref 0–1)
BILIRUB SERPL-MCNC: 0.74 MG/DL (ref 0.2–1)
BILIRUB UR QL STRIP: NEGATIVE
BUN SERPL-MCNC: 25 MG/DL (ref 5–25)
CALCIUM SERPL-MCNC: 8.9 MG/DL (ref 8.3–10.1)
CHLORIDE SERPL-SCNC: 102 MMOL/L (ref 96–108)
CLARITY UR: CLEAR
CO2 SERPL-SCNC: 27 MMOL/L (ref 21–32)
COLOR UR: YELLOW
CREAT SERPL-MCNC: 0.76 MG/DL (ref 0.6–1.3)
EOSINOPHIL # BLD AUTO: 0 THOUSAND/ÂΜL (ref 0–0.61)
EOSINOPHIL NFR BLD AUTO: 0 % (ref 0–6)
ERYTHROCYTE [DISTWIDTH] IN BLOOD BY AUTOMATED COUNT: 13.1 % (ref 11.6–15.1)
GFR SERPL CREATININE-BSD FRML MDRD: 70 ML/MIN/1.73SQ M
GLUCOSE SERPL-MCNC: 139 MG/DL (ref 65–140)
GLUCOSE UR STRIP-MCNC: NEGATIVE MG/DL
HCT VFR BLD AUTO: 49.6 % (ref 34.8–46.1)
HGB BLD-MCNC: 16.1 G/DL (ref 11.5–15.4)
HGB UR QL STRIP.AUTO: ABNORMAL
IMM GRANULOCYTES # BLD AUTO: 0.07 THOUSAND/UL (ref 0–0.2)
IMM GRANULOCYTES NFR BLD AUTO: 1 % (ref 0–2)
KETONES UR STRIP-MCNC: NEGATIVE MG/DL
LACTATE SERPL-SCNC: 1.8 MMOL/L (ref 0.5–2)
LACTATE SERPL-SCNC: 2.6 MMOL/L (ref 0.5–2)
LEUKOCYTE ESTERASE UR QL STRIP: NEGATIVE
LYMPHOCYTES # BLD AUTO: 1.13 THOUSANDS/ÂΜL (ref 0.6–4.47)
LYMPHOCYTES NFR BLD AUTO: 9 % (ref 14–44)
MCH RBC QN AUTO: 29.9 PG (ref 26.8–34.3)
MCHC RBC AUTO-ENTMCNC: 32.5 G/DL (ref 31.4–37.4)
MCV RBC AUTO: 92 FL (ref 82–98)
MONOCYTES # BLD AUTO: 0.83 THOUSAND/ÂΜL (ref 0.17–1.22)
MONOCYTES NFR BLD AUTO: 7 % (ref 4–12)
NEUTROPHILS # BLD AUTO: 10.03 THOUSANDS/ÂΜL (ref 1.85–7.62)
NEUTS SEG NFR BLD AUTO: 83 % (ref 43–75)
NITRITE UR QL STRIP: NEGATIVE
NON-SQ EPI CELLS URNS QL MICRO: ABNORMAL /HPF
NRBC BLD AUTO-RTO: 0 /100 WBCS
PH UR STRIP.AUTO: 6 [PH]
PLATELET # BLD AUTO: 223 THOUSANDS/UL (ref 149–390)
PMV BLD AUTO: 9.6 FL (ref 8.9–12.7)
POTASSIUM SERPL-SCNC: 4.7 MMOL/L (ref 3.5–5.3)
PROT SERPL-MCNC: 7.4 G/DL (ref 6.4–8.4)
PROT UR STRIP-MCNC: NEGATIVE MG/DL
RBC # BLD AUTO: 5.39 MILLION/UL (ref 3.81–5.12)
RBC #/AREA URNS AUTO: ABNORMAL /HPF
SODIUM SERPL-SCNC: 138 MMOL/L (ref 135–147)
SP GR UR STRIP.AUTO: 1.01 (ref 1–1.03)
UROBILINOGEN UR QL STRIP.AUTO: 0.2 E.U./DL
WBC # BLD AUTO: 12.1 THOUSAND/UL (ref 4.31–10.16)
WBC #/AREA URNS AUTO: ABNORMAL /HPF

## 2022-09-14 PROCEDURE — 99285 EMERGENCY DEPT VISIT HI MDM: CPT | Performed by: EMERGENCY MEDICINE

## 2022-09-14 PROCEDURE — 96361 HYDRATE IV INFUSION ADD-ON: CPT

## 2022-09-14 PROCEDURE — 99285 EMERGENCY DEPT VISIT HI MDM: CPT

## 2022-09-14 PROCEDURE — 0DTJ4ZZ RESECTION OF APPENDIX, PERCUTANEOUS ENDOSCOPIC APPROACH: ICD-10-PCS | Performed by: SURGERY

## 2022-09-14 PROCEDURE — 74177 CT ABD & PELVIS W/CONTRAST: CPT

## 2022-09-14 PROCEDURE — 1124F ACP DISCUSS-NO DSCNMKR DOCD: CPT | Performed by: EMERGENCY MEDICINE

## 2022-09-14 PROCEDURE — 44970 LAPAROSCOPY APPENDECTOMY: CPT | Performed by: PHYSICIAN ASSISTANT

## 2022-09-14 PROCEDURE — 96374 THER/PROPH/DIAG INJ IV PUSH: CPT

## 2022-09-14 PROCEDURE — 88304 TISSUE EXAM BY PATHOLOGIST: CPT | Performed by: PATHOLOGY

## 2022-09-14 PROCEDURE — 85025 COMPLETE CBC W/AUTO DIFF WBC: CPT | Performed by: EMERGENCY MEDICINE

## 2022-09-14 PROCEDURE — 83605 ASSAY OF LACTIC ACID: CPT | Performed by: EMERGENCY MEDICINE

## 2022-09-14 PROCEDURE — 81001 URINALYSIS AUTO W/SCOPE: CPT | Performed by: EMERGENCY MEDICINE

## 2022-09-14 PROCEDURE — 36415 COLL VENOUS BLD VENIPUNCTURE: CPT | Performed by: EMERGENCY MEDICINE

## 2022-09-14 PROCEDURE — 80053 COMPREHEN METABOLIC PANEL: CPT | Performed by: EMERGENCY MEDICINE

## 2022-09-14 RX ORDER — ONDANSETRON 2 MG/ML
4 INJECTION INTRAMUSCULAR; INTRAVENOUS EVERY 6 HOURS PRN
Status: DISCONTINUED | OUTPATIENT
Start: 2022-09-14 | End: 2022-09-15 | Stop reason: HOSPADM

## 2022-09-14 RX ORDER — ROCURONIUM BROMIDE 10 MG/ML
INJECTION, SOLUTION INTRAVENOUS AS NEEDED
Status: DISCONTINUED | OUTPATIENT
Start: 2022-09-14 | End: 2022-09-14

## 2022-09-14 RX ORDER — LATANOPROST 50 UG/ML
1 SOLUTION/ DROPS OPHTHALMIC
Status: DISCONTINUED | OUTPATIENT
Start: 2022-09-14 | End: 2022-09-15 | Stop reason: HOSPADM

## 2022-09-14 RX ORDER — SODIUM CHLORIDE, SODIUM LACTATE, POTASSIUM CHLORIDE, CALCIUM CHLORIDE 600; 310; 30; 20 MG/100ML; MG/100ML; MG/100ML; MG/100ML
INJECTION, SOLUTION INTRAVENOUS CONTINUOUS PRN
Status: DISCONTINUED | OUTPATIENT
Start: 2022-09-14 | End: 2022-09-14

## 2022-09-14 RX ORDER — MORPHINE SULFATE 4 MG/ML
4 INJECTION, SOLUTION INTRAMUSCULAR; INTRAVENOUS ONCE
Status: COMPLETED | OUTPATIENT
Start: 2022-09-14 | End: 2022-09-14

## 2022-09-14 RX ORDER — DEXAMETHASONE SODIUM PHOSPHATE 10 MG/ML
INJECTION, SOLUTION INTRAMUSCULAR; INTRAVENOUS AS NEEDED
Status: DISCONTINUED | OUTPATIENT
Start: 2022-09-14 | End: 2022-09-14

## 2022-09-14 RX ORDER — SODIUM CHLORIDE 9 MG/ML
50 INJECTION, SOLUTION INTRAVENOUS CONTINUOUS
Status: DISCONTINUED | OUTPATIENT
Start: 2022-09-14 | End: 2022-09-15

## 2022-09-14 RX ORDER — LEVOTHYROXINE SODIUM 0.07 MG/1
75 TABLET ORAL
Status: DISCONTINUED | OUTPATIENT
Start: 2022-09-15 | End: 2022-09-15 | Stop reason: HOSPADM

## 2022-09-14 RX ORDER — OXYCODONE HYDROCHLORIDE AND ACETAMINOPHEN 5; 325 MG/1; MG/1
1 TABLET ORAL EVERY 4 HOURS PRN
Status: DISCONTINUED | OUTPATIENT
Start: 2022-09-14 | End: 2022-09-15 | Stop reason: HOSPADM

## 2022-09-14 RX ORDER — BUPIVACAINE HYDROCHLORIDE AND EPINEPHRINE 2.5; 5 MG/ML; UG/ML
INJECTION, SOLUTION EPIDURAL; INFILTRATION; INTRACAUDAL; PERINEURAL AS NEEDED
Status: DISCONTINUED | OUTPATIENT
Start: 2022-09-14 | End: 2022-09-14 | Stop reason: HOSPADM

## 2022-09-14 RX ORDER — CEFAZOLIN SODIUM 2 G/50ML
2000 SOLUTION INTRAVENOUS
Status: COMPLETED | OUTPATIENT
Start: 2022-09-14 | End: 2022-09-14

## 2022-09-14 RX ORDER — ONDANSETRON 2 MG/ML
INJECTION INTRAMUSCULAR; INTRAVENOUS AS NEEDED
Status: DISCONTINUED | OUTPATIENT
Start: 2022-09-14 | End: 2022-09-14

## 2022-09-14 RX ORDER — MORPHINE SULFATE 10 MG/ML
2 INJECTION, SOLUTION INTRAMUSCULAR; INTRAVENOUS EVERY 4 HOURS PRN
Status: DISCONTINUED | OUTPATIENT
Start: 2022-09-14 | End: 2022-09-15 | Stop reason: HOSPADM

## 2022-09-14 RX ORDER — FENTANYL CITRATE 50 UG/ML
INJECTION, SOLUTION INTRAMUSCULAR; INTRAVENOUS AS NEEDED
Status: DISCONTINUED | OUTPATIENT
Start: 2022-09-14 | End: 2022-09-14

## 2022-09-14 RX ORDER — METRONIDAZOLE 500 MG/100ML
500 INJECTION, SOLUTION INTRAVENOUS
Status: ACTIVE | OUTPATIENT
Start: 2022-09-14 | End: 2022-09-14

## 2022-09-14 RX ORDER — PROPOFOL 10 MG/ML
INJECTION, EMULSION INTRAVENOUS CONTINUOUS PRN
Status: DISCONTINUED | OUTPATIENT
Start: 2022-09-14 | End: 2022-09-14

## 2022-09-14 RX ORDER — PROPOFOL 10 MG/ML
INJECTION, EMULSION INTRAVENOUS AS NEEDED
Status: DISCONTINUED | OUTPATIENT
Start: 2022-09-14 | End: 2022-09-14

## 2022-09-14 RX ORDER — PRAVASTATIN SODIUM 40 MG
40 TABLET ORAL
Status: DISCONTINUED | OUTPATIENT
Start: 2022-09-14 | End: 2022-09-15 | Stop reason: HOSPADM

## 2022-09-14 RX ORDER — ACETAMINOPHEN 325 MG/1
650 TABLET ORAL EVERY 6 HOURS PRN
Status: DISCONTINUED | OUTPATIENT
Start: 2022-09-14 | End: 2022-09-15 | Stop reason: HOSPADM

## 2022-09-14 RX ORDER — ENOXAPARIN SODIUM 100 MG/ML
40 INJECTION SUBCUTANEOUS DAILY
Status: DISCONTINUED | OUTPATIENT
Start: 2022-09-15 | End: 2022-09-15

## 2022-09-14 RX ORDER — FENTANYL CITRATE/PF 50 MCG/ML
25 SYRINGE (ML) INJECTION
Status: DISCONTINUED | OUTPATIENT
Start: 2022-09-14 | End: 2022-09-14 | Stop reason: HOSPADM

## 2022-09-14 RX ORDER — LIDOCAINE HYDROCHLORIDE 10 MG/ML
INJECTION, SOLUTION EPIDURAL; INFILTRATION; INTRACAUDAL; PERINEURAL AS NEEDED
Status: DISCONTINUED | OUTPATIENT
Start: 2022-09-14 | End: 2022-09-14

## 2022-09-14 RX ORDER — SODIUM CHLORIDE 9 MG/ML
125 INJECTION, SOLUTION INTRAVENOUS CONTINUOUS
Status: DISCONTINUED | OUTPATIENT
Start: 2022-09-14 | End: 2022-09-14

## 2022-09-14 RX ADMIN — PIPERACILLIN AND TAZOBACTAM 3.38 G: 36; 4.5 INJECTION, POWDER, FOR SOLUTION INTRAVENOUS at 09:17

## 2022-09-14 RX ADMIN — FENTANYL CITRATE 50 MCG: 50 INJECTION INTRAMUSCULAR; INTRAVENOUS at 10:34

## 2022-09-14 RX ADMIN — FENTANYL CITRATE 50 MCG: 50 INJECTION INTRAMUSCULAR; INTRAVENOUS at 11:11

## 2022-09-14 RX ADMIN — CEFAZOLIN SODIUM 2000 MG: 2 SOLUTION INTRAVENOUS at 09:52

## 2022-09-14 RX ADMIN — FENTANYL CITRATE 50 MCG: 50 INJECTION INTRAMUSCULAR; INTRAVENOUS at 11:02

## 2022-09-14 RX ADMIN — LIDOCAINE HYDROCHLORIDE 50 MG: 10 INJECTION, SOLUTION EPIDURAL; INFILTRATION; INTRACAUDAL; PERINEURAL at 10:34

## 2022-09-14 RX ADMIN — DEXAMETHASONE SODIUM PHOSPHATE 10 MG: 10 INJECTION INTRAMUSCULAR; INTRAVENOUS at 10:34

## 2022-09-14 RX ADMIN — ROCURONIUM BROMIDE 50 MG: 10 INJECTION, SOLUTION INTRAVENOUS at 10:34

## 2022-09-14 RX ADMIN — MORPHINE SULFATE 4 MG: 4 INJECTION INTRAVENOUS at 06:43

## 2022-09-14 RX ADMIN — LATANOPROST 1 DROP: 50 SOLUTION OPHTHALMIC at 21:27

## 2022-09-14 RX ADMIN — SODIUM CHLORIDE, SODIUM LACTATE, POTASSIUM CHLORIDE, AND CALCIUM CHLORIDE: .6; .31; .03; .02 INJECTION, SOLUTION INTRAVENOUS at 10:31

## 2022-09-14 RX ADMIN — ONDANSETRON 4 MG: 2 INJECTION INTRAMUSCULAR; INTRAVENOUS at 10:34

## 2022-09-14 RX ADMIN — PROPOFOL 100 MCG/KG/MIN: 10 INJECTION, EMULSION INTRAVENOUS at 10:37

## 2022-09-14 RX ADMIN — SODIUM CHLORIDE 500 ML: 0.9 INJECTION, SOLUTION INTRAVENOUS at 06:44

## 2022-09-14 RX ADMIN — SUGAMMADEX 300 MG: 100 INJECTION, SOLUTION INTRAVENOUS at 11:23

## 2022-09-14 RX ADMIN — IOHEXOL 100 ML: 350 INJECTION, SOLUTION INTRAVENOUS at 07:39

## 2022-09-14 RX ADMIN — PROPOFOL 130 MG: 10 INJECTION, EMULSION INTRAVENOUS at 10:34

## 2022-09-14 RX ADMIN — PRAVASTATIN SODIUM 40 MG: 40 TABLET ORAL at 17:31

## 2022-09-14 RX ADMIN — SODIUM CHLORIDE 50 ML/HR: 0.9 INJECTION, SOLUTION INTRAVENOUS at 16:00

## 2022-09-14 NOTE — ANESTHESIA PREPROCEDURE EVALUATION
Procedure:  APPENDECTOMY LAPAROSCOPIC (N/A Abdomen)    Relevant Problems   ANESTHESIA (within normal limits)      CARDIO (within normal limits)      ENDO   (+) Hypothyroidism      MUSCULOSKELETAL   (+) Intramuscular hematoma      PULMONARY (within normal limits)      Digestive   (+) Acute appendicitis        Physical Exam    Airway    Mallampati score: II  TM Distance: >3 FB  Neck ROM: full     Dental       Cardiovascular      Pulmonary      Other Findings        Anesthesia Plan  ASA Score- 2 Emergent    Anesthesia Type- general with ASA Monitors  Additional Monitors:   Airway Plan: ETT  Plan Factors-Exercise tolerance (METS): >4 METS  Chart reviewed  EKG reviewed  Existing labs reviewed  Patient summary reviewed  Patient is not a current smoker  Induction- intravenous  Postoperative Plan-     Informed Consent- Anesthetic plan and risks discussed with patient  I personally reviewed this patient with the CRNA  Discussed and agreed on the Anesthesia Plan with the CRNA  Maria M Rea

## 2022-09-14 NOTE — H&P
H&P Exam - General Surgery   Davidson Parada 80 y o  female MRN: [id number]  Unit/Bed#: ED 10 Encounter: [id number]    Assessment/Plan     Assessment:  Acute appendicitis    Plan:  Discussed risks, benefits and alternatives to surgery for acute appendicitis  Risks include but not limited to pain, bleeding, scarring, infection, anesthesia related complications, staple line leak, abscess, wound complications, hernia  All questions answered  Patient wishes to proceed with laparoscopic possible open appendectomy  Informed consent was obtained  Zosyn given in ED  Ancef and flagyl on call  Admit to surgery service  IV fluids - NSS at 125 ml/hr  VTE ppx  NPO except meds      History of Present Illness     HPI:  Davidson Parada is a 80 y o  female who presents with right lower abdominal pain since yesterday evening  She ate out at a [employer] gathering yesterday and thought she had food poisoning but denies nausea, vomiting or diarrhea  She presented this morning with continued abdominal pain  WBC was elevated and CT scan confirmed acute appendicitis  She denies hematuria, dysuria  No prior abdominal surgeries  Lives with her son and is active and healthy  No blood thinners  Review of Systems   Constitutional: Negative  HENT: Negative  Eyes: Negative  Respiratory: Negative  Cardiovascular: Negative  Gastrointestinal: Negative  Endocrine: Negative  Genitourinary: Negative  Musculoskeletal: Negative  Skin: Negative  Allergic/Immunologic: Negative  Neurological: Negative  Hematological: Negative  Psychiatric/Behavioral: Negative          Historical Information   Past Medical History:   Diagnosis Date    Disease of thyroid gland     Hypercholesterolemia      Past Surgical History:   Procedure Laterality Date    EYE SURGERY Bilateral     TONSILLECTOMY       Social History   Social History     Substance and Sexual Activity   Alcohol Use Not Currently     Social History     Substance and Sexual Activity   Drug Use Never     Social History     Tobacco Use   Smoking Status Never Smoker   Smokeless Tobacco Never Used     E-Cigarette/Vaping    E-Cigarette Use Never User      E-Cigarette/Vaping Substances     Family History: non-contributory    Meds/Allergies   PTA meds:   Prior to Admission Medications   Prescriptions Last Dose Informant Patient Reported?  Taking?   acetaminophen (TYLENOL) 325 mg tablet   No Yes   Sig: Take 2 tablets (650 mg total) by mouth every 6 (six) hours as needed for mild pain or fever   calcium citrate-vitamin D (CITRACAL+D) 315-200 MG-UNIT per tablet 9/14/2022 at Unknown time Self Yes Yes   Sig: Take 2 tablets by mouth daily   guaiFENesin (ROBITUSSIN) 100 MG/5ML oral liquid Not Taking at Unknown time  No No   Sig: Take 5-10 mL (100-200 mg total) by mouth every 4 (four) hours as needed for cough   Patient not taking: No sig reported   latanoprost (XALATAN) 0 005 % ophthalmic solution  Self Yes Yes   Sig: Administer 1 drop to both eyes daily at bedtime   levothyroxine 75 mcg tablet 9/14/2022 at Unknown time Self Yes Yes   Sig: Take 75 mcg by mouth daily   multivitamin-iron-minerals-folic acid (CENTRUM) chewable tablet 9/14/2022 at Unknown time Self Yes Yes   Sig: Chew 1 tablet daily   simvastatin (ZOCOR) 40 mg tablet 9/13/2022 at Unknown time Self Yes Yes   Sig: Take 40 mg by mouth daily at bedtime      Facility-Administered Medications: None     Allergies   Allergen Reactions    Aspirin Other (See Comments)     nausea vomiting    Bee Venom Other (See Comments)     Passed out   Rash        Objective   First Vitals:   Blood Pressure: 152/67 (09/14/22 0631)  Pulse: 82 (09/14/22 0631)  Temperature: 97 9 °F (36 6 °C) (09/14/22 0631)  Temp Source: Temporal (09/14/22 0631)  Respirations: 16 (09/14/22 0631)  Height: 5' 2" (157 5 cm) (09/14/22 0631)  Weight - Scale: 58 8 kg (129 lb 10 1 oz) (09/14/22 0631)  SpO2: 96 % (09/14/22 0631)    Current Vitals:   Blood Pressure: 152/68 (09/14/22 0700)  Pulse: 75 (09/14/22 0700)  Temperature: 97 9 °F (36 6 °C) (09/14/22 0631)  Temp Source: Temporal (09/14/22 0631)  Respirations: 16 (09/14/22 0700)  Height: 5' 2" (157 5 cm) (09/14/22 0631)  Weight - Scale: 58 8 kg (129 lb 10 1 oz) (09/14/22 0631)  SpO2: 95 % (09/14/22 0700)      Intake/Output Summary (Last 24 hours) at 9/14/2022 0910  Last data filed at 9/14/2022 0757  Gross per 24 hour   Intake 500 ml   Output --   Net 500 ml       Invasive Devices  Report    Peripheral Intravenous Line  Duration           Peripheral IV 09/14/22 Left Antecubital <1 day                Physical Exam  Constitutional:       Appearance: Normal appearance  She is normal weight  HENT:      Head: Normocephalic  Nose: Nose normal       Mouth/Throat:      Mouth: Mucous membranes are moist    Eyes:      Conjunctiva/sclera: Conjunctivae normal    Cardiovascular:      Rate and Rhythm: Normal rate and regular rhythm  Pulses: Normal pulses  Heart sounds: Normal heart sounds  Pulmonary:      Effort: Pulmonary effort is normal       Breath sounds: Normal breath sounds  Abdominal:      General: Abdomen is flat  Bowel sounds are normal       Palpations: Abdomen is soft  Tenderness: There is abdominal tenderness (rlq without rebound or guarding  )  Musculoskeletal:         General: Normal range of motion  Cervical back: Normal range of motion  Skin:     General: Skin is warm and dry  Capillary Refill: Capillary refill takes less than 2 seconds  Neurological:      General: No focal deficit present  Mental Status: She is alert and oriented to person, place, and time  Psychiatric:         Mood and Affect: Mood normal          Behavior: Behavior normal          Thought Content: Thought content normal          Judgment: Judgment normal          Lab Results:   I have personally reviewed pertinent lab results    , CBC:   Lab Results   Component Value Date    WBC 12 10 (H) 09/14/2022    HGB 16 1 (H) 09/14/2022    HCT 49 6 (H) 09/14/2022    MCV 92 09/14/2022     09/14/2022    MCH 29 9 09/14/2022    MCHC 32 5 09/14/2022    RDW 13 1 09/14/2022    MPV 9 6 09/14/2022    NRBC 0 09/14/2022   , CMP:   Lab Results   Component Value Date    SODIUM 138 09/14/2022    K 4 7 09/14/2022     09/14/2022    CO2 27 09/14/2022    BUN 25 09/14/2022    CREATININE 0 76 09/14/2022    CALCIUM 8 9 09/14/2022    AST 39 09/14/2022    ALT 30 09/14/2022    ALKPHOS 66 09/14/2022    EGFR 70 09/14/2022     Imaging: I have personally reviewed pertinent reports  and I have personally reviewed pertinent films in PACS  EKG, Pathology, and Other Studies: I have personally reviewed pertinent reports  and I have personally reviewed pertinent films in PACS    Code Status: Level 1 - Full Code  Advance Directive and Living Will:      Power of :    POLST:      Counseling / Coordination of Care  Total floor / unit time spent today 60 minutes  Greater than 50% of total time was spent with the patient and / or family counseling and / or coordination of care  A description of the counseling / coordination of care:      Pablo Pete DO

## 2022-09-14 NOTE — PLAN OF CARE
Problem: PAIN - ADULT  Goal: Verbalizes/displays adequate comfort level or baseline comfort level  Description: Interventions:  - Encourage patient to monitor pain and request assistance  - Assess pain using appropriate pain scale  - Administer analgesics based on type and severity of pain and evaluate response  - Implement non-pharmacological measures as appropriate and evaluate response  - Consider cultural and social influences on pain and pain management  - Notify physician/advanced practitioner if interventions unsuccessful or patient reports new pain  Outcome: Progressing     Problem: INFECTION - ADULT  Goal: Absence or prevention of progression during hospitalization  Description: INTERVENTIONS:  - Assess and monitor for signs and symptoms of infection  - Monitor lab/diagnostic results  - Monitor all insertion sites, i e  indwelling lines, tubes, and drains  - Monitor endotracheal if appropriate and nasal secretions for changes in amount and color  - [place] appropriate cooling/warming therapies per order  - Administer medications as ordered  - Instruct and encourage patient and family to use good hand hygiene technique  - Identify and instruct in appropriate isolation precautions for identified infection/condition  Outcome: Progressing  Goal: Absence of fever/infection during neutropenic period  Description: INTERVENTIONS:  - Monitor WBC    Outcome: Progressing     Problem: SAFETY ADULT  Goal: Patient will remain free of falls  Description: INTERVENTIONS:  - Educate patient/family on patient safety including physical limitations  - Instruct patient to call for assistance with activity   - Consult OT/PT to assist with strengthening/mobility   - Keep Call bell within reach  - Keep bed low and locked with side rails adjusted as appropriate  - Keep care items and personal belongings within reach  - Initiate and maintain comfort rounds  - Make Fall Risk Sign visible to staff  - Offer Toileting every 2 Hours, in advance of need  - Initiate/Maintain bed alarm  - Obtain necessary fall risk management equipment:   - Apply yellow socks and bracelet for high fall risk patients  - Consider moving patient to room near nurses station  Outcome: Progressing  Goal: Maintain or return to baseline ADL function  Description: INTERVENTIONS:  -  Assess patient's ability to carry out ADLs; assess patient's baseline for ADL function and identify physical deficits which impact ability to perform ADLs (bathing, care of mouth/teeth, toileting, grooming, dressing, etc )  - Assess/evaluate cause of self-care deficits   - Assess range of motion  - Assess patient's mobility; develop plan if impaired  - Assess patient's need for assistive devices and provide as appropriate  - Encourage maximum independence but intervene and supervise when necessary  - Involve family in performance of ADLs  - Assess for home care needs following discharge   - Consider OT consult to assist with ADL evaluation and planning for discharge  - Provide patient education as appropriate  Outcome: Progressing  Goal: Maintains/Returns to pre admission functional level  Description: INTERVENTIONS:  - Perform BMAT or MOVE assessment daily    - Set and communicate daily mobility goal to care team and patient/family/caregiver  - Collaborate with rehabilitation services on mobility goals if consulted  - Perform Range of Motion 2 times a day  - Reposition patient every 2 hours    - Dangle patient 2 times a day  - Stand patient 2 times a day  - Ambulate patient 2 times a day  - Out of bed to chair 2 times a day   - Out of bed for meals 2 times a day  - Out of bed for toileting  - Record patient progress and toleration of activity level   Outcome: Progressing     Problem: DISCHARGE PLANNING  Goal: Discharge to home or other facility with appropriate resources  Description: INTERVENTIONS:  - Identify barriers to discharge w/patient and caregiver  - Arrange for needed discharge resources and transportation as appropriate  - Identify discharge learning needs (meds, wound care, etc )  - Arrange for interpretive services to assist at discharge as needed  - Refer to Case Management Department for coordinating discharge planning if the patient needs post-hospital services based on physician/advanced practitioner order or complex needs related to functional status, cognitive ability, or social support system  Outcome: Progressing     Problem: Knowledge Deficit  Goal: Patient/family/caregiver demonstrates understanding of disease process, treatment plan, medications, and discharge instructions  Description: Complete learning assessment and assess knowledge base    Interventions:  - Provide teaching at level of understanding  - Provide teaching via preferred learning methods  Outcome: Progressing

## 2022-09-14 NOTE — ED PROVIDER NOTES
History  Chief Complaint   Patient presents with    Abdominal Pain     Mid/lower abdominal pain since eating at [employer] yesterday  Normal BM this morning  Denies N/V/D  49-year-old female describes subacute onset lower abdominal discomfort that began yesterday after lunch with turkey sandwich, shrimp, cookies at a [employer] get together  Notes by 4:00 p m  it was noticeably worse and severe by bedtime, unable to sleep all night  No analgesics used  No nausea or vomiting  No urinary complaints including urgency, hematuria or dysuria  She describes normal stooling, no constipation or bleeding  Past medical history includes hypertension and hyperlipidemia  No abdominal surgeries  History provided by:  Patient  Abdominal Pain  Pain location:  Suprapubic and LLQ  Pain quality: aching    Pain radiates to:  Does not radiate  Pain severity:  Severe  Onset quality:  Gradual  Timing:  Constant  Progression:  Worsening  Context: not diet changes, not recent illness and not trauma    Relieved by:  None tried  Worsened by:  Nothing  Ineffective treatments:  None tried  Associated symptoms: no chest pain, no constipation, no diarrhea, no fever and no shortness of breath    Risk factors: being elderly        Prior to Admission Medications   Prescriptions Last Dose Informant Patient Reported?  Taking?   acetaminophen (TYLENOL) 325 mg tablet   No Yes   Sig: Take 2 tablets (650 mg total) by mouth every 6 (six) hours as needed for mild pain or fever   calcium citrate-vitamin D (CITRACAL+D) 315-200 MG-UNIT per tablet 9/14/2022 at Unknown time Self Yes Yes   Sig: Take 2 tablets by mouth daily   guaiFENesin (ROBITUSSIN) 100 MG/5ML oral liquid Not Taking at Unknown time  No No   Sig: Take 5-10 mL (100-200 mg total) by mouth every 4 (four) hours as needed for cough   Patient not taking: No sig reported   latanoprost (XALATAN) 0 005 % ophthalmic solution  Self Yes Yes   Sig: Administer 1 drop to both eyes daily at bedtime   levothyroxine 75 mcg tablet 9/14/2022 at Unknown time Self Yes Yes   Sig: Take 75 mcg by mouth daily   multivitamin-iron-minerals-folic acid (CENTRUM) chewable tablet 9/14/2022 at Unknown time Self Yes Yes   Sig: Chew 1 tablet daily   simvastatin (ZOCOR) 40 mg tablet 9/13/2022 at Unknown time Self Yes Yes   Sig: Take 40 mg by mouth daily at bedtime      Facility-Administered Medications: None       Past Medical History:   Diagnosis Date    Disease of thyroid gland     Hypercholesterolemia        Past Surgical History:   Procedure Laterality Date    EYE SURGERY Bilateral     TONSILLECTOMY         Family History   Problem Relation Age of Onset    Hypertension Father      I have reviewed and agree with the history as documented  E-Cigarette/Vaping    E-Cigarette Use Never User      E-Cigarette/Vaping Substances     Social History     Tobacco Use    Smoking status: Never Smoker    Smokeless tobacco: Never Used   Vaping Use    Vaping Use: Never used   Substance Use Topics    Alcohol use: Not Currently    Drug use: Never       Review of Systems   Constitutional: Negative for fever  Respiratory: Negative for shortness of breath  Cardiovascular: Negative for chest pain  Gastrointestinal: Positive for abdominal pain  Negative for constipation and diarrhea  All other systems reviewed and are negative  Physical Exam  Physical Exam  Vitals and nursing note reviewed  Constitutional:       Comments: Pleasant, comfortable-appearing   HENT:      Head: Normocephalic and atraumatic  Mouth/Throat:      Mouth: Mucous membranes are moist       Pharynx: Oropharynx is clear  Eyes:      Conjunctiva/sclera: Conjunctivae normal       Pupils: Pupils are equal, round, and reactive to light  Cardiovascular:      Rate and Rhythm: Normal rate and regular rhythm  Heart sounds: Normal heart sounds  Pulmonary:      Effort: Pulmonary effort is normal       Breath sounds: Normal breath sounds  Abdominal:      General: Bowel sounds are normal  There is no distension  Palpations: Abdomen is soft  There is no mass  Tenderness: There is abdominal tenderness in the suprapubic area and left lower quadrant  There is no right CVA tenderness, left CVA tenderness, guarding or rebound  Musculoskeletal:         General: No deformity  Cervical back: Neck supple  Skin:     General: Skin is warm and dry  Neurological:      General: No focal deficit present  Mental Status: She is alert and oriented to person, place, and time  Cranial Nerves: No cranial nerve deficit  Coordination: Coordination normal    Psychiatric:         Behavior: Behavior normal          Thought Content:  Thought content normal          Judgment: Judgment normal          Vital Signs  ED Triage Vitals [09/14/22 0631]   Temperature Pulse Respirations Blood Pressure SpO2   97 9 °F (36 6 °C) 82 16 152/67 96 %      Temp Source Heart Rate Source Patient Position - Orthostatic VS BP Location FiO2 (%)   Temporal Monitor Lying Right arm --      Pain Score       4           Vitals:    09/14/22 0631   BP: 152/67   Pulse: 82   Patient Position - Orthostatic VS: Lying         Visual Acuity      ED Medications  Medications   sodium chloride 0 9 % bolus 500 mL (500 mL Intravenous New Bag 9/14/22 0644)   morphine injection 4 mg (4 mg Intravenous Given 9/14/22 0643)       Diagnostic Studies  Results Reviewed     Procedure Component Value Units Date/Time    CBC and differential [[id number]]  (Abnormal) Collected: 09/14/22 0643    Lab Status: Final result Specimen: Blood from Arm, Left Updated: 09/14/22 0649     WBC 12 10 Thousand/uL      RBC 5 39 Million/uL      Hemoglobin 16 1 g/dL      Hematocrit 49 6 %      MCV 92 fL      MCH 29 9 pg      MCHC 32 5 g/dL      RDW 13 1 %      MPV 9 6 fL      Platelets 438 Thousands/uL      nRBC 0 /100 WBCs      Neutrophils Relative 83 %      Immat GRANS % 1 %      Lymphocytes Relative 9 % Monocytes Relative 7 %      Eosinophils Relative 0 %      Basophils Relative 0 %      Neutrophils Absolute 10 03 Thousands/µL      Immature Grans Absolute 0 07 Thousand/uL      Lymphocytes Absolute 1 13 Thousands/µL      Monocytes Absolute 0 83 Thousand/µL      Eosinophils Absolute 0 00 Thousand/µL      Basophils Absolute 0 04 Thousands/µL     Comprehensive metabolic panel [[id number]] Collected: 09/14/22 0643    Lab Status: In process Specimen: Blood from Arm, Left Updated: 09/14/22 0646    Lactic acid [[id number]] Collected: 09/14/22 0643    Lab Status: In process Specimen: Blood from Arm, Left Updated: 09/14/22 0646    UA w Reflex to Microscopic w Reflex to Culture [[id number]]     Lab Status: No result Specimen: Urine                  CT abdomen pelvis with contrast    (Results Pending)              Procedures  Procedures         ED Course  ED Course as of 09/14/22 0708   Wed Sep 14, 2022   0653 WBC(!): 12 10   0706 Endorsed to [address]     Disposition  Final diagnoses:   Abdominal pain     Time reflects when diagnosis was documented in both MDM as applicable and the Disposition within this note     Time User Action Codes Description Comment    9/14/2022  6:46 AM Kevin Lange Add [R10 9] Abdominal pain       ED Disposition     None      Follow-up Information    None         Patient's Medications   Discharge Prescriptions    No medications on file       No discharge procedures on file      PDMP Review     None          ED Provider  Electronically Signed by           Andres Caraballo DO  09/14/22 7154

## 2022-09-14 NOTE — ANESTHESIA POSTPROCEDURE EVALUATION
Post-Op Assessment Note    CV Status:  Stable  Pain Score: 0    Pain management: adequate     Mental Status:  Sleepy and arousable   Hydration Status:  Stable   PONV Controlled:  Controlled   Airway Patency:  Patent      Post Op Vitals Reviewed: Yes      Staff: CRNA         No complications documented      BP   1400/72   Temp   97 8   Pulse  74   Resp   14   SpO2   97

## 2022-09-14 NOTE — DISCHARGE INSTRUCTIONS
Discharge Date:  9/14/2022  Procedure:  Procedure(s):  APPENDECTOMY LAPAROSCOPIC   Surgeon:  Nithin Vail, DO    Please contact Dr Carlette Landau office at [phone] with any concerns or questions  The information below provides you with the instructions and the list of medications you need to be taking following discharge from the hospital   If you have any questions, please ask before leaving  Please carry this letter with you when you see your doctor in the clinic  If you have questions, you can reach us at the numbers above        Follow Up Appointments:  If not listed below, and one has not already been made for you, call the general surgery office at [phone]  Activity:  No lifting pushing or pulling greater than 10 lbs for 2 weeks post op or until instructed otherwise by provider at your post op visit  No lifting greater than 20 lbs until you are 6 weeks post op  Diet:  Regular diet as tolerated  Incision:   Incisions are closed with absorbable sutures and covered with surgical glue  You may shower after 2 days but do not submerge incisions for at least 1 week  Pain Control: You should apply ice for 20 minutes on then 20 minutes off to your incision(s) for pain relief for the first 72 hours after surgery  Ice will decrease the immediate postoperative inflammation  After the first 72 hours you should switch to a heating pad in the same time increments  The heat will continue to allow the swelling to go down and decrease your pain  We do not typically prescribe narcotic pain medication after your type of surgery  You can try to take these following types of medications:  Tylenol: You may take over the counter Tylenol (Acetaminophen) 650 mg every 6 hours as needed  The maximum daily dose of Tylenol is 3000 mg  Avoid other medications with Tylenol  Motrin/Ibuprofen: You may take Motrin (Ibuprofen) up to 800 mg every 8 hours as needed   If your pain is not as severe you can use less than 800mg (each over the counter tablet is 200mg)  The maximum daily dose of Ibuprofen is 3200mg  Avoid other NSAID medications while taking Ibuprofen  Do NOT take Motrin/Ibuprofen or any other NSAID drugs while taking Toradol, or if you are on Coumadin or any other blood thinner, or were instructed to avoid ibuprofen previously  Constipation: It is normal not to have a bowel movement for up to 3 days after surgery due to anesthesia  To prevent constipation, drink plenty of liquids and eat plenty of fiber which includes fruits & vegetables  After surgery for the regimen below for relief of constipation:    Recommend the following daily bowel regimen for constipation in order from 1 to 3:  1  Colace 100 mg capsules two-three times daily  2  Miralax 1 packet or capful (17 grams) in [address] [facility] water daily to twice daily  3  Dulcolax suppository once daily as needed for constipation  If loose stools occur discontinue use of medications in reverse order (3,2,1)  Driving:  Don't drive until you are off narcotics for one full week and can walk normally and firmly apply the brake without pain  Date you may return to work or school: Will be discussed at your postop visit  Special Instructions:    Call if you have chills or a fever of greater than or equal to 100 5 degrees, any uncontrolled nausea, vomiting, bleeding, pain, diarrhea, constipation or shortness of breath  Regarding Anesthesia and Analgesia:  Do not drink alcoholic beverages, including beer, for 24 hours or while taking pain medication  Alcohol enhances the effects of anesthesia and sedation  Do not drive a motor vehicle, operate machinery or power tools for 24 hours  If a child, no bicycle riding, skateboards, gym sets, etc  For 24 hours  Do not make any important decisions or sign important papers for 24 hours  You may experience lightheadedness, dizziness and sleepiness following surgery/procedure    Please DO NOT STAY ALONE   A responsible adult should be with you for this 24 hour period  Rest at home with moderate activity as tolerated  It may be necessary to go to bed, however, it is important to rest for 24 hours  Progress slowly to a regular diet unless your physician has instructed you otherwise  Start with liquids such as soft drinks, then soup and crackers, gradually working up to solid foods  Certain anesthetics, pain medications and/or surgical procedures may produce nausea and vomitting in certain individuals  If nausea becomes a problem at home, call your physician  In the meantime, rest or sleep on our side to avoid accidentally inhaling material that you may vomit  Patients receiving general anesthesia may experience sore throat and general muscle aches, chest soreness  Should this occur, we recommend rest and liquids  These symptoms should disappear in approximately 24 hours  Patients having spinal anesthesia, we recommend drinking a lot of liquids containing caffeine, such as coffee, tea, cola, chocolate  If any symptoms persist, notify your physician  Incentive Spirometer Instructions:  Due to your surgery, it may be too painful to take deep breaths  You may also feel too weak to take deep breaths  When you do not breathe deeply enough, this can lead to your lungs not being completely inflated which in turn leads to shortness of breath, fever, and can increase your risk of postoperative pneumonia  An incentive spirometer is a device used to help you keep your lungs healthy while they are healing  The incentive spirometer teaches you how to take slow deep breaths  By using the incentive spirometer every 1 - 2 hours, or as directed by your nurse or doctor, you can take an active role in your recovery and keep your lungs healthy  How to Use an Incentive Spirometer   Sit up and hold the incentive spirometer upright  Place the mouthpiece of the incentive spirometer into your mouth   Make sure you make a good seal with your lips  Breathe out (exhale) normally  Breathe in SLOWLY (Inhale slowly)  A piece in the incentive spirometer will rise as you take a breath in  Try to get this piece to rise as high as you can  Usually, there is a marker placed by your health care provider that tells you how big of a breath you should take  Hold your breath for a few (3 - 5) seconds, then slowly release your breath and exhale  Repeat 10 - 15 breaths every 1 - 2 hours

## 2022-09-14 NOTE — OP NOTE
OPERATIVE REPORT  PATIENT NAME: Mary Saldaña    :  1934  MRN: [id number]  Pt Location:  OR ROOM 01    SURGERY DATE: 2022    Surgeon(s) and Role:     * Richard Cardona DO - Primary     * Rachel Ghotra PA-C - Assisting    Preop Diagnosis:  Acute appendicitis [K35 80]    Post-Op Diagnosis Codes:     * Acute appendicitis [K35 80]    Procedure(s) (LRB):  APPENDECTOMY LAPAROSCOPIC (N/A)    Specimen(s):  ID Type Source Tests Collected by Time Destination   1 :  Tissue Appendix TISSUE EXAM Richard Cardona DO 2022 11:11 AM        Estimated Blood Loss:   Minimal    Drains:  Urethral Catheter Non-latex 16 Fr  (Active)   Number of days: 0       [REMOVED] NG/OG/Enteral Tube Orogastric 18 Fr Right mouth (Removed)   Number of days: 0       Anesthesia Type:   General    Operative Indications:  Acute appendicitis [K35 80]      Operative Findings:  Inflamed, non-perforated appendix  Complications:   None    Procedure and Technique:  The patient was taken to the operating room and correctly identified and the procedure was verified  They were placed in a supine position on the OR table and general anesthesia was administered  They received pre-operative antibiotics and venous thromboembolism prophylaxis as well as a abbasi catheter  The abdomen was prepped and draped in a sterile fashion  A time out was held and the above information was confirmed  After instillation of local anesthetic into the skin, an incision was made above the umbilicus and the umbilical stalk was grasped and lifted up  A Veress needle was inserted into the abdominal wall at palmers point and pneumoperitoneum was established  A 5 mm trocar was placed in this supraumbilical position  The laparoscopic camera was placed through the camera and the abdomen was explored  The patient was placed in Trendelenburg position and Right side up   An additional 5 mm trocar was placed in the suprapubic position and a 12 mm trocar was placed in the left lateral abdomen  The bowel was swept away from the right abdomen revealing the appendix behind the cecum  The appendix was grasped with a lucila grasper and elevated  A window was made in the mesentery with a [place] grasper and the laparoscopic stapler with a blue load was used to divide the appendix at its base  The Laparoscopic harmonic scalpel was then used to divide the mesentery  The appendix was placed in an Endocatch bag and removed from the 12 mm trocar site  The abdomen was examined for hemostasis and this was confirmed  The trocars were removed under direct visualization and the pneumoperitoneum was expelled  The wounds were closed with 4-0 Monocryl and covered with surgical glue  This marked the end of the procedure  All needle, instrument and sponge counts were correct times two  The patient awoke from anesthesia and was taken to recovery in stable condition          I was present for the entire procedure and A physician assistant was required during the procedure for retraction tissue handling,dissection and suturing    Patient Disposition:  PACU         SIGNATURE: [unfilled]  DATE: September 14, 2022  TIME: 11:24 AM

## 2022-09-15 VITALS
TEMPERATURE: 98.2 F | RESPIRATION RATE: 16 BRPM | HEIGHT: 62 IN | HEART RATE: 75 BPM | SYSTOLIC BLOOD PRESSURE: 118 MMHG | WEIGHT: 130.4 LBS | DIASTOLIC BLOOD PRESSURE: 50 MMHG | OXYGEN SATURATION: 97 % | BODY MASS INDEX: 24 KG/M2

## 2022-09-15 LAB
ANION GAP SERPL CALCULATED.3IONS-SCNC: 8 MMOL/L (ref 4–13)
BUN SERPL-MCNC: 17 MG/DL (ref 5–25)
CALCIUM SERPL-MCNC: 8.6 MG/DL (ref 8.3–10.1)
CHLORIDE SERPL-SCNC: 108 MMOL/L (ref 96–108)
CO2 SERPL-SCNC: 26 MMOL/L (ref 21–32)
CREAT SERPL-MCNC: 0.63 MG/DL (ref 0.6–1.3)
ERYTHROCYTE [DISTWIDTH] IN BLOOD BY AUTOMATED COUNT: 13.3 % (ref 11.6–15.1)
GFR SERPL CREATININE-BSD FRML MDRD: 80 ML/MIN/1.73SQ M
GLUCOSE SERPL-MCNC: 123 MG/DL (ref 65–140)
HCT VFR BLD AUTO: 39.3 % (ref 34.8–46.1)
HGB BLD-MCNC: 12.7 G/DL (ref 11.5–15.4)
MCH RBC QN AUTO: 29.8 PG (ref 26.8–34.3)
MCHC RBC AUTO-ENTMCNC: 32.3 G/DL (ref 31.4–37.4)
MCV RBC AUTO: 92 FL (ref 82–98)
PLATELET # BLD AUTO: 198 THOUSANDS/UL (ref 149–390)
PMV BLD AUTO: 10 FL (ref 8.9–12.7)
POTASSIUM SERPL-SCNC: 3.7 MMOL/L (ref 3.5–5.3)
RBC # BLD AUTO: 4.26 MILLION/UL (ref 3.81–5.12)
SODIUM SERPL-SCNC: 142 MMOL/L (ref 135–147)
WBC # BLD AUTO: 9.66 THOUSAND/UL (ref 4.31–10.16)

## 2022-09-15 PROCEDURE — 80048 BASIC METABOLIC PNL TOTAL CA: CPT | Performed by: SURGERY

## 2022-09-15 PROCEDURE — 85027 COMPLETE CBC AUTOMATED: CPT | Performed by: SURGERY

## 2022-09-15 RX ADMIN — LEVOTHYROXINE SODIUM 75 MCG: 75 TABLET ORAL at 05:36

## 2022-09-15 NOTE — PLAN OF CARE
Problem: INFECTION - ADULT  Goal: Absence or prevention of progression during hospitalization  Description: INTERVENTIONS:  - Assess and monitor for signs and symptoms of infection  - Monitor lab/diagnostic results  - Monitor all insertion sites, i e  indwelling lines, tubes, and drains  - Monitor endotracheal if appropriate and nasal secretions for changes in amount and color  - [place] appropriate cooling/warming therapies per order  - Administer medications as ordered  - Instruct and encourage patient and family to use good hand hygiene technique  - Identify and instruct in appropriate isolation precautions for identified infection/condition  Outcome: Adequate for Discharge

## 2022-09-15 NOTE — PROGRESS NOTES
Progress Note - General Surgery   Ranny Liner 80 y o  female MRN: [id number]  Unit/Bed#: -01 Encounter: [id number]    Assessment:  Acute appendicitis s/p laparoscopic appendectomy    Plan:  Regular diet this morning  Discontinue IV fluids  Up and out of bed  Discharge home  Follow up general surgery in 2 weeks  Subjective/Objective     Subjective: doing really well this morning  No pain overnight, no PRN pain medications requested  Tolerated liquid diet without nausea    Objective:     Blood pressure (!) 100/43, pulse 71, temperature 98 2 °F (36 8 °C), resp  rate 18, height 5' 2" (1 575 m), weight 59 1 kg (130 lb 6 4 oz), SpO2 95 %  ,Body mass index is 23 85 kg/m²  Intake/Output Summary (Last 24 hours) at 9/15/2022 9998  Last data filed at 9/14/2022 1828  Gross per 24 hour   Intake 1950 ml   Output 1 ml   Net 1949 ml       Invasive Devices  Report    Peripheral Intravenous Line  Duration           Peripheral IV 09/14/22 Left Antecubital 1 day                Physical Exam: General appearance: alert and oriented, in no acute distress  Lungs: clear to auscultation bilaterally  Heart: regular rate and rhythm, S1, S2 normal, no murmur, click, rub or gallop  Abdomen: soft, non-tender; bowel sounds normal; no masses,  no organomegaly and surgical incisions with surgical glue intact  some bruising around the left lateral incision  Neurologic: Alert and oriented X 3, normal strength and tone  Normal symmetric reflexes  Normal coordination and gait    Lab, Imaging and other studies:  I have personally reviewed pertinent lab results    , CBC:   Lab Results   Component Value Date    WBC 9 66 09/15/2022    HGB 12 7 09/15/2022    HCT 39 3 09/15/2022    MCV 92 09/15/2022     09/15/2022    MCH 29 8 09/15/2022    MCHC 32 3 09/15/2022    RDW 13 3 09/15/2022    MPV 10 0 09/15/2022   , CMP:   Lab Results   Component Value Date    SODIUM 142 09/15/2022    K 3 7 09/15/2022     09/15/2022    CO2 26 09/15/2022    BUN 17 09/15/2022    CREATININE 0 63 09/15/2022    CALCIUM 8 6 09/15/2022    EGFR 80 09/15/2022     VTE Pharmacologic Prophylaxis: Enoxaparin (Lovenox)  VTE Mechanical Prophylaxis: sequential compression device     Didi Villanueva DO

## 2022-09-15 NOTE — PLAN OF CARE
Problem: PAIN - ADULT  Goal: Verbalizes/displays adequate comfort level or baseline comfort level  Description: Interventions:  - Encourage patient to monitor pain and request assistance  - Assess pain using appropriate pain scale  - Administer analgesics based on type and severity of pain and evaluate response  - Implement non-pharmacological measures as appropriate and evaluate response  - Consider cultural and social influences on pain and pain management  - Notify physician/advanced practitioner if interventions unsuccessful or patient reports new pain  Outcome: Progressing     Problem: INFECTION - ADULT  Goal: Absence or prevention of progression during hospitalization  Description: INTERVENTIONS:  - Assess and monitor for signs and symptoms of infection  - Monitor lab/diagnostic results  - Monitor all insertion sites, i e  indwelling lines, tubes, and drains  - Monitor endotracheal if appropriate and nasal secretions for changes in amount and color  - [place] appropriate cooling/warming therapies per order  - Administer medications as ordered  - Instruct and encourage patient and family to use good hand hygiene technique  - Identify and instruct in appropriate isolation precautions for identified infection/condition  Outcome: Progressing  Goal: Absence of fever/infection during neutropenic period  Description: INTERVENTIONS:  - Monitor WBC    Outcome: Progressing     Problem: SAFETY ADULT  Goal: Patient will remain free of falls  Description: INTERVENTIONS:  - Educate patient/family on patient safety including physical limitations  - Instruct patient to call for assistance with activity   - Consult OT/PT to assist with strengthening/mobility   - Keep Call bell within reach  - Keep bed low and locked with side rails adjusted as appropriate  - Keep care items and personal belongings within reach  - Initiate and maintain comfort rounds  - Make Fall Risk Sign visible to staff  - Offer Toileting every 2 Hours, in advance of need  - Initiate/Maintain bed alarm  - Apply yellow socks and bracelet for high fall risk patients  - Consider moving patient to room near nurses station  Outcome: Progressing  Goal: Maintain or return to baseline ADL function  Description: INTERVENTIONS:  -  Assess patient's ability to carry out ADLs; assess patient's baseline for ADL function and identify physical deficits which impact ability to perform ADLs (bathing, care of mouth/teeth, toileting, grooming, dressing, etc )  - Assess/evaluate cause of self-care deficits   - Assess range of motion  - Assess patient's mobility; develop plan if impaired  - Assess patient's need for assistive devices and provide as appropriate  - Encourage maximum independence but intervene and supervise when necessary  - Involve family in performance of ADLs  - Assess for home care needs following discharge   - Consider OT consult to assist with ADL evaluation and planning for discharge  - Provide patient education as appropriate  Outcome: Progressing  Goal: Maintains/Returns to pre admission functional level  Description: INTERVENTIONS:  - Perform BMAT or MOVE assessment daily    - Set and communicate daily mobility goal to care team and patient/family/caregiver     - Collaborate with rehabilitation services on mobility goals if consulted  - Out of bed for toileting  - Record patient progress and toleration of activity level   Outcome: Progressing     Problem: DISCHARGE PLANNING  Goal: Discharge to home or other facility with appropriate resources  Description: INTERVENTIONS:  - Identify barriers to discharge w/patient and caregiver  - Arrange for needed discharge resources and transportation as appropriate  - Identify discharge learning needs (meds, wound care, etc )  - Arrange for interpretive services to assist at discharge as needed  - Refer to Case Management Department for coordinating discharge planning if the patient needs post-hospital services based on physician/advanced practitioner order or complex needs related to functional status, cognitive ability, or social support system  Outcome: Progressing     Problem: Knowledge Deficit  Goal: Patient/family/caregiver demonstrates understanding of disease process, treatment plan, medications, and discharge instructions  Description: Complete learning assessment and assess knowledge base    Interventions:  - Provide teaching at level of understanding  - Provide teaching via preferred learning methods  Outcome: Progressing

## 2022-09-15 NOTE — DISCHARGE SUMMARY
Discharge Summary - Sienna Ramos 80 y o  female MRN: [id number]    Unit/Bed#: -01 Encounter: [id number]    Admission Date:   Admission Orders (From admission, onward)     Ordered        09/14/22 0907  Inpatient Admission  Once                        Admitting Diagnosis: Abdominal pain [R10 9]  Acute appendicitis [K35 80]    HPI: Sienna Ramos is a 80 y o  female who presents with right lower abdominal pain since yesterday evening  She ate out at a [employer] gathering yesterday and thought she had food poisoning but denies nausea, vomiting or diarrhea  She presented this morning with continued abdominal pain  WBC was elevated and CT scan confirmed acute appendicitis  She denies hematuria, dysuria  No prior abdominal surgeries  Lives with her son and is active and healthy  No blood thinners  Procedures Performed: No orders of the defined types were placed in this encounter  Summary of Hospital Course: patient underwent laparoscopic appendectomy  She did well and was discharged home the following day  Significant Findings, Care, Treatment and Services Provided: acute appendicitis - appendectomy    Complications: none    Discharge Diagnosis: acute appendicitis    Medical Problems             Resolved Problems  Date Reviewed: 9/14/2022   None                 Condition at Discharge: good         Discharge instructions/Information to patient and family:   See after visit summary for information provided to patient and family  Provisions for Follow-Up Care:  See after visit summary for information related to follow-up care and any pertinent home health orders  PCP: Bernie Holder MD    Disposition: Home    Planned Readmission: No      Discharge Statement   I spent 30 minutes discharging the patient  This time was spent on the day of discharge  I had direct contact with the patient on the day of discharge  Additional documentation is required if more than 30 minutes were spent on discharge  Discharge Medications:  See after visit summary for reconciled discharge medications provided to patient and family

## 2022-09-20 PROCEDURE — 88304 TISSUE EXAM BY PATHOLOGIST: CPT | Performed by: PATHOLOGY

## 2022-09-21 NOTE — PHYSICIAN ADVISOR
Current patient class: Inpatient  The patient is currently on Hospital Day: 2 at / [facility]      The patient was admitted to the hospital  on 9/14/22 at  9:07 AM for the following diagnosis:  Abdominal pain [R10 9]  Acute appendicitis [K35 80]     After review of the relevant documentation, labs, vital signs and test results, this is a PROVIDER LIABLE case  In this particular case the patient was admitted to the hospital as an inpatient  The patient however failed to satisfy the 2 midnight benchmark and closer scrutiny of the case was warranted  After review of the patient presentation and relevant labs the patient was most appropriate for observation or outpatient class at the time of admission  Given that this patient has already been discharged prior to this review they become a provider liable case  Rationale is as follows: The patient is a 80 yrs   Female who presented to the ED at 9/14/2022  6:27 AM with a chief complaint of Abdominal Pain (Mid/lower abdominal pain since eating at [employer] potluck yesterday  Normal BM this morning  Denies N/V/D ) Patient found to have acute appendicitis  She was started on IV antibiotics  She underwent uncomplicated laparscopic appendectomy 9/14/22  She was stable for discharge on hospital day 2  This is an OPS procedure per ARS review  Part B correction is recommended      The patients vitals on arrival were   ED Triage Vitals [09/14/22 0631]   Temperature Pulse Respirations Blood Pressure SpO2   97 9 °F (36 6 °C) 82 16 152/67 96 %      Temp Source Heart Rate Source Patient Position - Orthostatic VS BP Location FiO2 (%)   Temporal Monitor Lying Right arm --      Pain Score       4           Past Medical History:   Diagnosis Date    Disease of thyroid gland     Hypercholesterolemia      Past Surgical History:   Procedure Laterality Date    APPENDECTOMY LAPAROSCOPIC N/A 9/14/2022    Procedure: APPENDECTOMY LAPAROSCOPIC;  Surgeon: Ish Velásquez DO;  Location:  MAIN OR;  Service: General    EYE SURGERY Bilateral     TONSILLECTOMY             Consults have been placed to:   None    Vitals:    09/14/22 2337 09/15/22 0304 09/15/22 0541 09/15/22 0814   BP: 132/76 (!) 100/43  118/50   BP Location:       Pulse: 68 71  75   Resp: 20 18  16   Temp: 97 9 °F (36 6 °C) 98 2 °F (36 8 °C)  98 2 °F (36 8 °C)   TempSrc:       SpO2: 93% 95%  97%   Weight:   59 1 kg (130 lb 6 4 oz)    Height:           Most recent labs:    No results for input(s): WBC, HGB, HCT, PLT, K, NA, CALCIUM, BUN, CREATININE, LIPASE, AMYLASE, INR, TROPONINI, CKTOTAL, AST, ALT, ALKPHOS, BILITOT in the last 72 hours  Scheduled Meds:  Continuous Infusions:No current facility-administered medications for this encounter  PRN Meds:      Surgical procedures (if appropriate):  Procedure(s):  APPENDECTOMY LAPAROSCOPIC

## (undated) DEVICE — SUT VICRYL 0 REEL 54 IN J287G

## (undated) DEVICE — PAD GROUNDING ADULT

## (undated) DEVICE — ETS45 RELOAD STANDARD 45MM: Brand: ENDOPATH

## (undated) DEVICE — ENDOPATH PNEUMONEEDLE INSUFFLATION NEEDLES WITH LUER LOCK CONNECTORS 120MM: Brand: ENDOPATH

## (undated) DEVICE — TUBING INSUFFLATION SET ISO CONNECTOR

## (undated) DEVICE — SUT MONOCRYL 4-0 PS-2 27 IN Y426H

## (undated) DEVICE — GLOVE INDICATOR PI UNDERGLOVE SZ 8.5 BLUE

## (undated) DEVICE — 5 MM BABCOCKS WITH RATCHET HANDLES: Brand: ENDOPATH

## (undated) DEVICE — ADHESIVE SKIN HIGH VISCOSITY EXOFIN 1ML

## (undated) DEVICE — GLOVE SRG BIOGEL ECLIPSE 8

## (undated) DEVICE — HARMONIC 1100 SHEARS, 36CM SHAFT LENGTH: Brand: HARMONIC

## (undated) DEVICE — ENDOPATH ETS-FLEX45 ARTICULATING ENDOSCOPIC LINEAR CUTTER, NO RELOAD: Brand: ENDOPATH

## (undated) DEVICE — VIAL DECANTER

## (undated) DEVICE — ENDOPOUCH RETRIEVER SPECIMEN RETRIEVAL BAGS: Brand: ENDOPOUCH RETRIEVER

## (undated) DEVICE — INTENDED FOR TISSUE SEPARATION, AND OTHER PROCEDURES THAT REQUIRE A SHARP SURGICAL BLADE TO PUNCTURE OR CUT.: Brand: BARD-PARKER SAFETY BLADES SIZE 11, STERILE

## (undated) DEVICE — SINGLE PORT MANIFOLD: Brand: NEPTUNE 2

## (undated) DEVICE — ALLENTOWN LAP CHOLE APP PACK: Brand: CARDINAL HEALTH

## (undated) DEVICE — ENDOPATH XCEL UNIVERSAL TROCAR STABLILITY SLEEVES: Brand: ENDOPATH XCEL

## (undated) DEVICE — CHLORAPREP HI-LITE 26ML ORANGE

## (undated) DEVICE — TROCAR: Brand: KII FIOS FIRST ENTRY

## (undated) DEVICE — PMI DISPOSABLE PUNCTURE CLOSURE DEVICE / SUTURE GRASPER: Brand: PMI

## (undated) DEVICE — SCD SEQUENTIAL COMPRESSION COMFORT SLEEVE MEDIUM KNEE LENGTH: Brand: KENDALL SCD

## (undated) DEVICE — TRAY FOLEY 16FR URIMETER SURESTEP

## (undated) DEVICE — VISUALIZATION SYSTEM: Brand: CLEARIFY